# Patient Record
Sex: MALE | Race: WHITE | NOT HISPANIC OR LATINO | Employment: UNEMPLOYED | URBAN - METROPOLITAN AREA
[De-identification: names, ages, dates, MRNs, and addresses within clinical notes are randomized per-mention and may not be internally consistent; named-entity substitution may affect disease eponyms.]

---

## 2017-12-01 ENCOUNTER — OFFICE VISIT - HEALTHEAST (OUTPATIENT)
Dept: FAMILY MEDICINE | Facility: CLINIC | Age: 52
End: 2017-12-01

## 2017-12-01 ENCOUNTER — COMMUNICATION - HEALTHEAST (OUTPATIENT)
Dept: SCHEDULING | Facility: CLINIC | Age: 52
End: 2017-12-01

## 2017-12-01 DIAGNOSIS — I10 HTN (HYPERTENSION) WITH GOAL TO BE DETERMINED: ICD-10-CM

## 2017-12-01 ASSESSMENT — MIFFLIN-ST. JEOR: SCORE: 1632.32

## 2017-12-18 ENCOUNTER — OFFICE VISIT - HEALTHEAST (OUTPATIENT)
Dept: FAMILY MEDICINE | Facility: CLINIC | Age: 52
End: 2017-12-18

## 2017-12-18 DIAGNOSIS — Z13.21 SCREENING FOR ENDOCRINE, NUTRITIONAL, METABOLIC AND IMMUNITY DISORDER: ICD-10-CM

## 2017-12-18 DIAGNOSIS — I10 HYPERTENSION: ICD-10-CM

## 2017-12-18 DIAGNOSIS — Z00.00 VISIT FOR PREVENTIVE HEALTH EXAMINATION: ICD-10-CM

## 2017-12-18 DIAGNOSIS — Z12.11 SCREEN FOR COLON CANCER: ICD-10-CM

## 2017-12-18 DIAGNOSIS — E66.3 OVERWEIGHT (BMI 25.0-29.9): ICD-10-CM

## 2017-12-18 DIAGNOSIS — Z13.0 SCREENING FOR ENDOCRINE, NUTRITIONAL, METABOLIC AND IMMUNITY DISORDER: ICD-10-CM

## 2017-12-18 DIAGNOSIS — F41.9 ANXIETY: ICD-10-CM

## 2017-12-18 DIAGNOSIS — Z12.5 SCREENING PSA (PROSTATE SPECIFIC ANTIGEN): ICD-10-CM

## 2017-12-18 DIAGNOSIS — Z13.228 SCREENING FOR ENDOCRINE, NUTRITIONAL, METABOLIC AND IMMUNITY DISORDER: ICD-10-CM

## 2017-12-18 DIAGNOSIS — Z13.29 SCREENING FOR ENDOCRINE, NUTRITIONAL, METABOLIC AND IMMUNITY DISORDER: ICD-10-CM

## 2017-12-18 LAB
CHOLEST SERPL-MCNC: 237 MG/DL
FASTING STATUS PATIENT QL REPORTED: YES
HBA1C MFR BLD: 5.2 % (ref 3.5–6)
HDLC SERPL-MCNC: 38 MG/DL
LDLC SERPL CALC-MCNC: 127 MG/DL
PSA SERPL-MCNC: 0.8 NG/ML (ref 0–3.5)
TRIGL SERPL-MCNC: 358 MG/DL

## 2017-12-18 ASSESSMENT — MIFFLIN-ST. JEOR: SCORE: 1618.71

## 2018-01-17 ENCOUNTER — COMMUNICATION - HEALTHEAST (OUTPATIENT)
Dept: FAMILY MEDICINE | Facility: CLINIC | Age: 53
End: 2018-01-17

## 2018-03-05 ENCOUNTER — COMMUNICATION - HEALTHEAST (OUTPATIENT)
Dept: FAMILY MEDICINE | Facility: CLINIC | Age: 53
End: 2018-03-05

## 2018-06-18 ENCOUNTER — COMMUNICATION - HEALTHEAST (OUTPATIENT)
Dept: FAMILY MEDICINE | Facility: CLINIC | Age: 53
End: 2018-06-18

## 2018-06-18 DIAGNOSIS — I10 HYPERTENSION: ICD-10-CM

## 2018-10-03 ENCOUNTER — COMMUNICATION - HEALTHEAST (OUTPATIENT)
Dept: SCHEDULING | Facility: CLINIC | Age: 53
End: 2018-10-03

## 2018-10-11 ENCOUNTER — OFFICE VISIT - HEALTHEAST (OUTPATIENT)
Dept: FAMILY MEDICINE | Facility: CLINIC | Age: 53
End: 2018-10-11

## 2018-10-11 DIAGNOSIS — E78.2 MIXED HYPERLIPIDEMIA: ICD-10-CM

## 2018-10-11 DIAGNOSIS — Z00.00 VISIT FOR PREVENTIVE HEALTH EXAMINATION: ICD-10-CM

## 2018-10-11 DIAGNOSIS — E66.3 OVERWEIGHT (BMI 25.0-29.9): ICD-10-CM

## 2018-10-11 DIAGNOSIS — F41.9 ANXIETY: ICD-10-CM

## 2018-10-11 DIAGNOSIS — I10 BENIGN ESSENTIAL HYPERTENSION: ICD-10-CM

## 2018-10-11 LAB
ALBUMIN SERPL-MCNC: 4.3 G/DL (ref 3.5–5)
ALP SERPL-CCNC: 84 U/L (ref 45–120)
ALT SERPL W P-5'-P-CCNC: 38 U/L (ref 0–45)
ANION GAP SERPL CALCULATED.3IONS-SCNC: 11 MMOL/L (ref 5–18)
AST SERPL W P-5'-P-CCNC: 26 U/L (ref 0–40)
BILIRUB SERPL-MCNC: 0.8 MG/DL (ref 0–1)
BUN SERPL-MCNC: 9 MG/DL (ref 8–22)
CALCIUM SERPL-MCNC: 10.1 MG/DL (ref 8.5–10.5)
CHLORIDE BLD-SCNC: 106 MMOL/L (ref 98–107)
CHOLEST SERPL-MCNC: 252 MG/DL
CO2 SERPL-SCNC: 23 MMOL/L (ref 22–31)
CREAT SERPL-MCNC: 0.8 MG/DL (ref 0.7–1.3)
FASTING STATUS PATIENT QL REPORTED: NO
GFR SERPL CREATININE-BSD FRML MDRD: >60 ML/MIN/1.73M2
GLUCOSE BLD-MCNC: 99 MG/DL (ref 70–125)
HBA1C MFR BLD: 5.4 % (ref 3.5–6)
HDLC SERPL-MCNC: 42 MG/DL
LDLC SERPL CALC-MCNC: 157 MG/DL
POTASSIUM BLD-SCNC: 4.1 MMOL/L (ref 3.5–5)
PROT SERPL-MCNC: 7.2 G/DL (ref 6–8)
SODIUM SERPL-SCNC: 140 MMOL/L (ref 136–145)
TRIGL SERPL-MCNC: 264 MG/DL
TSH SERPL DL<=0.005 MIU/L-ACNC: 2.42 UIU/ML (ref 0.3–5)

## 2018-10-11 ASSESSMENT — MIFFLIN-ST. JEOR: SCORE: 1618.71

## 2018-12-13 ENCOUNTER — COMMUNICATION - HEALTHEAST (OUTPATIENT)
Dept: FAMILY MEDICINE | Facility: CLINIC | Age: 53
End: 2018-12-13

## 2018-12-13 DIAGNOSIS — I10 HYPERTENSION: ICD-10-CM

## 2019-01-28 ENCOUNTER — COMMUNICATION - HEALTHEAST (OUTPATIENT)
Dept: FAMILY MEDICINE | Facility: CLINIC | Age: 54
End: 2019-01-28

## 2019-01-28 DIAGNOSIS — I10 ESSENTIAL HYPERTENSION: ICD-10-CM

## 2019-04-01 ENCOUNTER — AMBULATORY - HEALTHEAST (OUTPATIENT)
Dept: FAMILY MEDICINE | Facility: CLINIC | Age: 54
End: 2019-04-01

## 2019-05-29 ENCOUNTER — COMMUNICATION - HEALTHEAST (OUTPATIENT)
Dept: FAMILY MEDICINE | Facility: CLINIC | Age: 54
End: 2019-05-29

## 2019-05-29 DIAGNOSIS — F41.9 ANXIETY: ICD-10-CM

## 2019-06-05 ENCOUNTER — COMMUNICATION - HEALTHEAST (OUTPATIENT)
Dept: FAMILY MEDICINE | Facility: CLINIC | Age: 54
End: 2019-06-05

## 2019-06-05 DIAGNOSIS — I10 ESSENTIAL HYPERTENSION: ICD-10-CM

## 2019-06-05 DIAGNOSIS — I10 HYPERTENSION: ICD-10-CM

## 2019-09-05 ENCOUNTER — COMMUNICATION - HEALTHEAST (OUTPATIENT)
Dept: FAMILY MEDICINE | Facility: CLINIC | Age: 54
End: 2019-09-05

## 2019-09-10 ENCOUNTER — COMMUNICATION - HEALTHEAST (OUTPATIENT)
Dept: FAMILY MEDICINE | Facility: CLINIC | Age: 54
End: 2019-09-10

## 2019-09-10 DIAGNOSIS — I10 ESSENTIAL HYPERTENSION: ICD-10-CM

## 2019-09-24 ENCOUNTER — COMMUNICATION - HEALTHEAST (OUTPATIENT)
Dept: FAMILY MEDICINE | Facility: CLINIC | Age: 54
End: 2019-09-24

## 2019-09-24 DIAGNOSIS — I10 HYPERTENSION: ICD-10-CM

## 2019-12-09 ENCOUNTER — OFFICE VISIT - HEALTHEAST (OUTPATIENT)
Dept: FAMILY MEDICINE | Facility: CLINIC | Age: 54
End: 2019-12-09

## 2019-12-09 DIAGNOSIS — I10 HYPERTENSION: ICD-10-CM

## 2019-12-09 DIAGNOSIS — Z12.11 SCREEN FOR COLON CANCER: ICD-10-CM

## 2019-12-09 DIAGNOSIS — R10.32 LLQ ABDOMINAL PAIN: ICD-10-CM

## 2019-12-09 DIAGNOSIS — Z12.11 SCREENING FOR COLON CANCER: ICD-10-CM

## 2019-12-09 DIAGNOSIS — F41.9 ANXIETY: ICD-10-CM

## 2019-12-09 DIAGNOSIS — I10 ESSENTIAL HYPERTENSION: ICD-10-CM

## 2019-12-09 LAB
ALBUMIN SERPL-MCNC: 4.5 G/DL (ref 3.5–5)
ALBUMIN UR-MCNC: NEGATIVE MG/DL
ALP SERPL-CCNC: 103 U/L (ref 45–120)
ALT SERPL W P-5'-P-CCNC: 31 U/L (ref 0–45)
ANION GAP SERPL CALCULATED.3IONS-SCNC: 11 MMOL/L (ref 5–18)
APPEARANCE UR: CLEAR
AST SERPL W P-5'-P-CCNC: 28 U/L (ref 0–40)
BASOPHILS # BLD AUTO: 0.1 THOU/UL (ref 0–0.2)
BASOPHILS NFR BLD AUTO: 1 % (ref 0–2)
BILIRUB SERPL-MCNC: 0.7 MG/DL (ref 0–1)
BILIRUB UR QL STRIP: NEGATIVE
BUN SERPL-MCNC: 7 MG/DL (ref 8–22)
CALCIUM SERPL-MCNC: 10 MG/DL (ref 8.5–10.5)
CHLORIDE BLD-SCNC: 101 MMOL/L (ref 98–107)
CO2 SERPL-SCNC: 27 MMOL/L (ref 22–31)
COLOR UR AUTO: YELLOW
CREAT SERPL-MCNC: 0.84 MG/DL (ref 0.7–1.3)
EOSINOPHIL # BLD AUTO: 0.1 THOU/UL (ref 0–0.4)
EOSINOPHIL NFR BLD AUTO: 2 % (ref 0–6)
ERYTHROCYTE [DISTWIDTH] IN BLOOD BY AUTOMATED COUNT: 10.5 % (ref 11–14.5)
GFR SERPL CREATININE-BSD FRML MDRD: >60 ML/MIN/1.73M2
GLUCOSE BLD-MCNC: 97 MG/DL (ref 70–125)
GLUCOSE UR STRIP-MCNC: NEGATIVE MG/DL
HCT VFR BLD AUTO: 49.1 % (ref 40–54)
HGB BLD-MCNC: 16.9 G/DL (ref 14–18)
HGB UR QL STRIP: NEGATIVE
KETONES UR STRIP-MCNC: NEGATIVE MG/DL
LEUKOCYTE ESTERASE UR QL STRIP: NEGATIVE
LYMPHOCYTES # BLD AUTO: 1.7 THOU/UL (ref 0.8–4.4)
LYMPHOCYTES NFR BLD AUTO: 23 % (ref 20–40)
MCH RBC QN AUTO: 37.1 PG (ref 27–34)
MCHC RBC AUTO-ENTMCNC: 34.5 G/DL (ref 32–36)
MCV RBC AUTO: 108 FL (ref 80–100)
MONOCYTES # BLD AUTO: 0.6 THOU/UL (ref 0–0.9)
MONOCYTES NFR BLD AUTO: 8 % (ref 2–10)
NEUTROPHILS # BLD AUTO: 5 THOU/UL (ref 2–7.7)
NEUTROPHILS NFR BLD AUTO: 67 % (ref 50–70)
NITRATE UR QL: NEGATIVE
PH UR STRIP: 6.5 [PH] (ref 5–8)
PLATELET # BLD AUTO: 241 THOU/UL (ref 140–440)
PMV BLD AUTO: 7.8 FL (ref 7–10)
POTASSIUM BLD-SCNC: 4.6 MMOL/L (ref 3.5–5)
PROT SERPL-MCNC: 7.8 G/DL (ref 6–8)
RBC # BLD AUTO: 4.56 MILL/UL (ref 4.4–6.2)
SODIUM SERPL-SCNC: 139 MMOL/L (ref 136–145)
SP GR UR STRIP: 1.01 (ref 1–1.03)
UROBILINOGEN UR STRIP-ACNC: NORMAL
WBC: 7.6 THOU/UL (ref 4–11)

## 2019-12-11 ENCOUNTER — HOSPITAL ENCOUNTER (OUTPATIENT)
Dept: CT IMAGING | Facility: CLINIC | Age: 54
Discharge: HOME | End: 2019-12-11
Attending: FAMILY MEDICINE

## 2019-12-11 DIAGNOSIS — R10.32 LLQ ABDOMINAL PAIN: ICD-10-CM

## 2019-12-12 ENCOUNTER — COMMUNICATION - HEALTHEAST (OUTPATIENT)
Dept: FAMILY MEDICINE | Facility: CLINIC | Age: 54
End: 2019-12-12

## 2020-08-27 ENCOUNTER — COMMUNICATION - HEALTHEAST (OUTPATIENT)
Dept: FAMILY MEDICINE | Facility: CLINIC | Age: 55
End: 2020-08-27

## 2020-08-27 DIAGNOSIS — I10 HYPERTENSION: ICD-10-CM

## 2020-09-07 ENCOUNTER — COMMUNICATION - HEALTHEAST (OUTPATIENT)
Dept: FAMILY MEDICINE | Facility: CLINIC | Age: 55
End: 2020-09-07

## 2020-09-07 DIAGNOSIS — F41.9 ANXIETY: ICD-10-CM

## 2020-09-10 RX ORDER — ALPRAZOLAM 0.5 MG
TABLET ORAL
Qty: 10 TABLET | Refills: 0 | Status: SHIPPED | OUTPATIENT
Start: 2020-09-10 | End: 2021-11-04

## 2020-10-29 ENCOUNTER — OFFICE VISIT - HEALTHEAST (OUTPATIENT)
Dept: FAMILY MEDICINE | Facility: CLINIC | Age: 55
End: 2020-10-29

## 2020-10-29 DIAGNOSIS — E78.2 MIXED HYPERLIPIDEMIA: ICD-10-CM

## 2020-10-29 DIAGNOSIS — K57.30 DIVERTICULOSIS OF LARGE INTESTINE WITHOUT HEMORRHAGE: ICD-10-CM

## 2020-10-29 DIAGNOSIS — I10 HYPERTENSION: ICD-10-CM

## 2020-10-29 DIAGNOSIS — Z12.11 SCREENING FOR COLON CANCER: ICD-10-CM

## 2020-10-29 DIAGNOSIS — Z00.00 VISIT FOR PREVENTIVE HEALTH EXAMINATION: ICD-10-CM

## 2020-10-29 DIAGNOSIS — I10 ESSENTIAL HYPERTENSION: ICD-10-CM

## 2020-10-29 DIAGNOSIS — Z12.5 SCREENING PSA (PROSTATE SPECIFIC ANTIGEN): ICD-10-CM

## 2020-10-29 DIAGNOSIS — Z11.4 SCREENING FOR HIV WITHOUT PRESENCE OF RISK FACTORS: ICD-10-CM

## 2020-10-29 DIAGNOSIS — Z11.59 ENCOUNTER FOR HCV SCREENING TEST FOR LOW RISK PATIENT: ICD-10-CM

## 2020-10-29 LAB
ALBUMIN SERPL-MCNC: 4.7 G/DL (ref 3.5–5)
ALP SERPL-CCNC: 83 U/L (ref 45–120)
ALT SERPL W P-5'-P-CCNC: 52 U/L (ref 0–45)
ANION GAP SERPL CALCULATED.3IONS-SCNC: 15 MMOL/L (ref 5–18)
AST SERPL W P-5'-P-CCNC: 42 U/L (ref 0–40)
BASOPHILS # BLD AUTO: 0.1 THOU/UL (ref 0–0.2)
BASOPHILS NFR BLD AUTO: 1 % (ref 0–2)
BILIRUB SERPL-MCNC: 1 MG/DL (ref 0–1)
BUN SERPL-MCNC: 8 MG/DL (ref 8–22)
CALCIUM SERPL-MCNC: 10.1 MG/DL (ref 8.5–10.5)
CHLORIDE BLD-SCNC: 99 MMOL/L (ref 98–107)
CHOLEST SERPL-MCNC: 254 MG/DL
CO2 SERPL-SCNC: 26 MMOL/L (ref 22–31)
CREAT SERPL-MCNC: 0.82 MG/DL (ref 0.7–1.3)
EOSINOPHIL # BLD AUTO: 0.1 THOU/UL (ref 0–0.4)
EOSINOPHIL NFR BLD AUTO: 2 % (ref 0–6)
ERYTHROCYTE [DISTWIDTH] IN BLOOD BY AUTOMATED COUNT: 12.5 % (ref 11–14.5)
FASTING STATUS PATIENT QL REPORTED: YES
GFR SERPL CREATININE-BSD FRML MDRD: >60 ML/MIN/1.73M2
GLUCOSE BLD-MCNC: 85 MG/DL (ref 70–125)
HCT VFR BLD AUTO: 51.9 % (ref 40–54)
HDLC SERPL-MCNC: 48 MG/DL
HGB BLD-MCNC: 18 G/DL (ref 14–18)
HIV 1+2 AB+HIV1 P24 AG SERPL QL IA: NEGATIVE
IMM GRANULOCYTES # BLD: 0 THOU/UL
IMM GRANULOCYTES NFR BLD: 0 %
LDLC SERPL CALC-MCNC: 176 MG/DL
LYMPHOCYTES # BLD AUTO: 1.3 THOU/UL (ref 0.8–4.4)
LYMPHOCYTES NFR BLD AUTO: 18 % (ref 20–40)
MCH RBC QN AUTO: 36.1 PG (ref 27–34)
MCHC RBC AUTO-ENTMCNC: 34.7 G/DL (ref 32–36)
MCV RBC AUTO: 104 FL (ref 80–100)
MONOCYTES # BLD AUTO: 0.7 THOU/UL (ref 0–0.9)
MONOCYTES NFR BLD AUTO: 10 % (ref 2–10)
NEUTROPHILS # BLD AUTO: 4.9 THOU/UL (ref 2–7.7)
NEUTROPHILS NFR BLD AUTO: 69 % (ref 50–70)
PLATELET # BLD AUTO: 240 THOU/UL (ref 140–440)
PMV BLD AUTO: 11.3 FL (ref 8.5–12.5)
POTASSIUM BLD-SCNC: 4.4 MMOL/L (ref 3.5–5)
PROT SERPL-MCNC: 7.8 G/DL (ref 6–8)
PSA SERPL-MCNC: 0.5 NG/ML (ref 0–3.5)
RBC # BLD AUTO: 4.98 MILL/UL (ref 4.4–6.2)
SODIUM SERPL-SCNC: 140 MMOL/L (ref 136–145)
TRIGL SERPL-MCNC: 149 MG/DL
TSH SERPL DL<=0.005 MIU/L-ACNC: 1.55 UIU/ML (ref 0.3–5)
WBC: 7.1 THOU/UL (ref 4–11)

## 2020-10-29 ASSESSMENT — MIFFLIN-ST. JEOR: SCORE: 1600.64

## 2020-10-30 ENCOUNTER — COMMUNICATION - HEALTHEAST (OUTPATIENT)
Dept: FAMILY MEDICINE | Facility: CLINIC | Age: 55
End: 2020-10-30

## 2020-10-30 LAB — HCV AB SERPL QL IA: NEGATIVE

## 2020-10-30 RX ORDER — BENAZEPRIL/HYDROCHLOROTHIAZIDE 20 MG-25MG
1 TABLET ORAL DAILY
Qty: 90 TABLET | Refills: 3 | Status: SHIPPED | OUTPATIENT
Start: 2020-10-30 | End: 2021-07-13

## 2020-11-01 LAB — LIPOPROTEIN (A) - HISTORICAL: 20 MG/DL

## 2020-11-02 ENCOUNTER — COMMUNICATION - HEALTHEAST (OUTPATIENT)
Dept: FAMILY MEDICINE | Facility: CLINIC | Age: 55
End: 2020-11-02

## 2020-11-02 DIAGNOSIS — E78.5 HYPERLIPIDEMIA LDL GOAL <130: ICD-10-CM

## 2020-11-02 RX ORDER — ATORVASTATIN CALCIUM 10 MG/1
10 TABLET, FILM COATED ORAL DAILY
Qty: 30 TABLET | Refills: 11 | Status: SHIPPED | OUTPATIENT
Start: 2020-11-02 | End: 2021-09-14

## 2020-11-03 ENCOUNTER — COMMUNICATION - HEALTHEAST (OUTPATIENT)
Dept: FAMILY MEDICINE | Facility: CLINIC | Age: 55
End: 2020-11-03

## 2020-11-03 DIAGNOSIS — I10 ESSENTIAL HYPERTENSION: ICD-10-CM

## 2021-04-02 ENCOUNTER — AMBULATORY - HEALTHEAST (OUTPATIENT)
Dept: NURSING | Facility: CLINIC | Age: 56
End: 2021-04-02

## 2021-04-23 ENCOUNTER — RECORDS - HEALTHEAST (OUTPATIENT)
Dept: ADMINISTRATIVE | Facility: OTHER | Age: 56
End: 2021-04-23

## 2021-04-23 ENCOUNTER — AMBULATORY - HEALTHEAST (OUTPATIENT)
Dept: NURSING | Facility: CLINIC | Age: 56
End: 2021-04-23

## 2021-05-27 ENCOUNTER — RECORDS - HEALTHEAST (OUTPATIENT)
Dept: ADMINISTRATIVE | Facility: CLINIC | Age: 56
End: 2021-05-27

## 2021-05-29 NOTE — TELEPHONE ENCOUNTER
Refill Approved    Rx renewed per Medication Renewal Policy.     Benazepril-HTCZ was last renewed on 1/29/19  #90 R-2.    Last OV 10/11/18    Shaila Godwin, [facility] Connection Triage/Med Refill 6/6/2019     Requested Prescriptions   Pending Prescriptions Disp Refills     benazepril-hydrochlorthiazide (LOTENSIN HCT) 10-12.5 mg per tablet [Pharmacy Med Name: BENAZEPRIL/HCTZ 10/12.5MG TABLETS] 90 tablet 0     Sig: TAKE 1 TABLET BY MOUTH DAILY       Diuretics/Combination Diuretics Refill Protocol  Passed - 6/5/2019  9:51 AM        Passed - Visit with PCP or prescribing provider visit in past 12 months     Last office visit with prescriber/PCP: 6/28/2016 Tj Early MD OR same dept: Visit date not found OR same specialty: 12/1/2017 Tata Manuel MD  Last physical: 10/11/2018 Last MTM visit: Visit date not found   Next visit within 3 mo: Visit date not found  Next physical within 3 mo: Visit date not found  Prescriber OR PCP: Tj Early MD  Last diagnosis associated with med order: 1. Essential hypertension  - benazepril-hydrochlorthiazide (LOTENSIN HCT) 10-12.5 mg per tablet [Pharmacy Med Name: BENAZEPRIL/HCTZ 10/12.5MG TABLETS]; TAKE 1 TABLET BY MOUTH DAILY  Dispense: 90 tablet; Refill: 0    2. Hypertension  - lisinopril (PRINIVIL,ZESTRIL) 5 MG tablet [Pharmacy Med Name: LISINOPRIL 5MG TABLETS]; TAKE 1 TABLET(5 MG) BY MOUTH DAILY  Dispense: 90 tablet; Refill: 0    If protocol passes may refill for 12 months if within 3 months of last provider visit (or a total of 15 months).             Passed - Serum Potassium in past 12 months      Lab Results   Component Value Date    Potassium 4.1 10/11/2018             Passed - Serum Sodium in past 12 months      Lab Results   Component Value Date    Sodium 140 10/11/2018             Passed - Blood pressure on file in past 12 months     BP Readings from Last 1 Encounters:   10/11/18 134/86             Passed - Serum Creatinine in past 12 months       Creatinine   Date Value Ref Range Status   10/11/2018 0.80 0.70 - 1.30 mg/dL Final             lisinopril (PRINIVIL,ZESTRIL) 5 MG tablet [Pharmacy Med Name: LISINOPRIL 5MG TABLETS] 90 tablet 0     Sig: TAKE 1 TABLET(5 MG) BY MOUTH DAILY       Ace Inhibitors Refill Protocol Passed - 6/5/2019  9:51 AM        Passed - PCP or prescribing provider visit in past 12 months       Last office visit with prescriber/PCP: 6/28/2016 Tj Early MD OR same dept: Visit date not found OR same specialty: 12/1/2017 Tata Manuel MD  Last physical: 10/11/2018 Last MTM visit: Visit date not found   Next visit within 3 mo: Visit date not found  Next physical within 3 mo: Visit date not found  Prescriber OR PCP: Tj Early MD  Last diagnosis associated with med order: 1. Essential hypertension  - benazepril-hydrochlorthiazide (LOTENSIN HCT) 10-12.5 mg per tablet [Pharmacy Med Name: BENAZEPRIL/HCTZ 10/12.5MG TABLETS]; TAKE 1 TABLET BY MOUTH DAILY  Dispense: 90 tablet; Refill: 0    2. Hypertension  - lisinopril (PRINIVIL,ZESTRIL) 5 MG tablet [Pharmacy Med Name: LISINOPRIL 5MG TABLETS]; TAKE 1 TABLET(5 MG) BY MOUTH DAILY  Dispense: 90 tablet; Refill: 0    If protocol passes may refill for 12 months if within 3 months of last provider visit (or a total of 15 months).             Passed - Serum Potassium in past 12 months     Lab Results   Component Value Date    Potassium 4.1 10/11/2018             Passed - Blood pressure filed in past 12 months     BP Readings from Last 1 Encounters:   10/11/18 134/86             Passed - Serum Creatinine in past 12 months     Creatinine   Date Value Ref Range Status   10/11/2018 0.80 0.70 - 1.30 mg/dL Final

## 2021-05-29 NOTE — TELEPHONE ENCOUNTER
RN cannot approve Refill Request    RN can NOT refill this medication Lisinopril is a  historical medication requested. Last office visit: 6/28/2016 Tj Early MD Last Physical: 10/11/2018 Last MTM visit: Visit date not found Last visit same specialty: 12/1/2017 Tata Manuel MD.  Next visit within 3 mo: Visit date not found  Next physical within 3 mo: Visit date not found      Shaila Godwin, [facility] Triage/Med Refill 6/7/2019    Requested Prescriptions   Pending Prescriptions Disp Refills     lisinopril (PRINIVIL,ZESTRIL) 5 MG tablet [Pharmacy Med Name: LISINOPRIL 5MG TABLETS] 90 tablet 0     Sig: TAKE 1 TABLET(5 MG) BY MOUTH DAILY       Ace Inhibitors Refill Protocol Passed - 6/5/2019  9:51 AM        Passed - PCP or prescribing provider visit in past 12 months       Last office visit with prescriber/PCP: 6/28/2016 Tj Early MD OR same dept: Visit date not found OR same specialty: 12/1/2017 Tata Manuel MD  Last physical: 10/11/2018 Last MTM visit: Visit date not found   Next visit within 3 mo: Visit date not found  Next physical within 3 mo: Visit date not found  Prescriber OR PCP: Tj Early MD  Last diagnosis associated with med order: 1. Essential hypertension  - benazepril-hydrochlorthiazide (LOTENSIN HCT) 10-12.5 mg per tablet; TAKE 1 TABLET BY MOUTH DAILY  Dispense: 90 tablet; Refill: 1    2. Hypertension  - lisinopril (PRINIVIL,ZESTRIL) 5 MG tablet [Pharmacy Med Name: LISINOPRIL 5MG TABLETS]; TAKE 1 TABLET(5 MG) BY MOUTH DAILY  Dispense: 90 tablet; Refill: 0    If protocol passes may refill for 12 months if within 3 months of last provider visit (or a total of 15 months).             Passed - Serum Potassium in past 12 months     Lab Results   Component Value Date    Potassium 4.1 10/11/2018             Passed - Blood pressure filed in past 12 months     BP Readings from Last 1 Encounters:   10/11/18 134/86             Passed - Serum  Creatinine in past 12 months     Creatinine   Date Value Ref Range Status   10/11/2018 0.80 0.70 - 1.30 mg/dL Final           Signed Prescriptions Disp Refills    benazepril-hydrochlorthiazide (LOTENSIN HCT) 10-12.5 mg per tablet 90 tablet 1     Sig: TAKE 1 TABLET BY MOUTH DAILY       Diuretics/Combination Diuretics Refill Protocol  Passed - 6/5/2019  9:51 AM        Passed - Visit with PCP or prescribing provider visit in past 12 months     Last office visit with prescriber/PCP: 6/28/2016 Tj Early MD OR same dept: Visit date not found OR same specialty: 12/1/2017 Tata Manuel MD  Last physical: 10/11/2018 Last MTM visit: Visit date not found   Next visit within 3 mo: Visit date not found  Next physical within 3 mo: Visit date not found  Prescriber OR PCP: Tj Early MD  Last diagnosis associated with med order: 1. Essential hypertension  - benazepril-hydrochlorthiazide (LOTENSIN HCT) 10-12.5 mg per tablet; TAKE 1 TABLET BY MOUTH DAILY  Dispense: 90 tablet; Refill: 1    2. Hypertension  - lisinopril (PRINIVIL,ZESTRIL) 5 MG tablet [Pharmacy Med Name: LISINOPRIL 5MG TABLETS]; TAKE 1 TABLET(5 MG) BY MOUTH DAILY  Dispense: 90 tablet; Refill: 0    If protocol passes may refill for 12 months if within 3 months of last provider visit (or a total of 15 months).             Passed - Serum Potassium in past 12 months      Lab Results   Component Value Date    Potassium 4.1 10/11/2018             Passed - Serum Sodium in past 12 months      Lab Results   Component Value Date    Sodium 140 10/11/2018             Passed - Blood pressure on file in past 12 months     BP Readings from Last 1 Encounters:   10/11/18 134/86             Passed - Serum Creatinine in past 12 months      Creatinine   Date Value Ref Range Status   10/11/2018 0.80 0.70 - 1.30 mg/dL Final

## 2021-05-29 NOTE — TELEPHONE ENCOUNTER
Controlled Substance Refill Request  Medication:   Requested Prescriptions     Pending Prescriptions Disp Refills     ALPRAZolam (XANAX) 0.5 MG tablet 10 tablet 0     Sig: Take 1 tablet (0.5 mg total) by mouth daily as needed for anxiety.     Date Last Fill: 10/11/18  Pharmacy: [id number]   Submit electronically to pharmacy  Controlled Substance Agreement on File:   Encounter-Level CSA Scan Date:    There are no encounter-level csa scan date.       Last office visit: Last office visit pertaining to requested medication was 10/11/18.

## 2021-05-31 VITALS — BODY MASS INDEX: 26.51 KG/M2 | HEIGHT: 69 IN | WEIGHT: 179 LBS

## 2021-05-31 VITALS — WEIGHT: 176 LBS | HEIGHT: 69 IN | BODY MASS INDEX: 26.07 KG/M2

## 2021-06-01 NOTE — TELEPHONE ENCOUNTER
Refill Request  Did you contact pharmacy: Yes  Medication name:   Requested Prescriptions     Pending Prescriptions Disp Refills     benazepril-hydrochlorthiazide (LOTENSIN HCT) 10-12.5 mg per tablet 90 tablet 1     Sig: Take 1 tablet by mouth daily.     Who prescribed the medication:   XIOMARA MEZA  Pharmacy Name and Location:   [id number]  Is patient out of medication: Yes  Patient notified refills processed in 72 hours:  yes  Okay to leave a detailed message: yes    Please expedite per patient request.

## 2021-06-01 NOTE — TELEPHONE ENCOUNTER
Refill Approved    Rx renewed per Medication Renewal Policy. Medication was last renewed on 6/7/19.    Nathalie Huizar, [facility] [place] Connection Triage/Med Refill 9/25/2019     Requested Prescriptions   Pending Prescriptions Disp Refills     lisinopril (PRINIVIL,ZESTRIL) 5 MG tablet 90 tablet 0     Sig: Take 1 tablet (5 mg total) by mouth daily.       Ace Inhibitors Refill Protocol Passed - 9/24/2019  9:03 AM        Passed - PCP or prescribing provider visit in past 12 months       Last office visit with prescriber/PCP: 6/28/2016 Tj Early MD OR same dept: Visit date not found OR same specialty: 12/1/2017 Tata Manuel MD  Last physical: 10/11/2018 Last MTM visit: Visit date not found   Next visit within 3 mo: Visit date not found  Next physical within 3 mo: Visit date not found  Prescriber OR PCP: Tj Early MD  Last diagnosis associated with med order: 1. Hypertension  - lisinopril (PRINIVIL,ZESTRIL) 5 MG tablet; Take 1 tablet (5 mg total) by mouth daily.  Dispense: 90 tablet; Refill: 0    If protocol passes may refill for 12 months if within 3 months of last provider visit (or a total of 15 months).             Passed - Serum Potassium in past 12 months     Lab Results   Component Value Date    Potassium 4.1 10/11/2018             Passed - Blood pressure filed in past 12 months     BP Readings from Last 1 Encounters:   10/11/18 134/86             Passed - Serum Creatinine in past 12 months     Creatinine   Date Value Ref Range Status   10/11/2018 0.80 0.70 - 1.30 mg/dL Final

## 2021-06-01 NOTE — TELEPHONE ENCOUNTER
Refill Approved    Rx renewed per Medication Renewal Policy. Medication was last renewed on 6/6/2019.  Last OV 10/11/2018.    Jessica De Leon, [facility] Connection Triage/Med Refill 9/10/2019     Requested Prescriptions   Pending Prescriptions Disp Refills     benazepril-hydrochlorthiazide (LOTENSIN HCT) 10-12.5 mg per tablet 90 tablet 1     Sig: Take 1 tablet by mouth daily.       Diuretics/Combination Diuretics Refill Protocol  Passed - 9/10/2019 12:22 PM        Passed - Visit with PCP or prescribing provider visit in past 12 months     Last office visit with prescriber/PCP: 6/28/2016 Tj Early MD OR same dept: Visit date not found OR same specialty: 12/1/2017 Tata Manuel MD  Last physical: 10/11/2018 Last MTM visit: Visit date not found   Next visit within 3 mo: Visit date not found  Next physical within 3 mo: Visit date not found  Prescriber OR PCP: Tj Early MD  Last diagnosis associated with med order: 1. Essential hypertension  - benazepril-hydrochlorthiazide (LOTENSIN HCT) 10-12.5 mg per tablet; Take 1 tablet by mouth daily.  Dispense: 90 tablet; Refill: 1    If protocol passes may refill for 12 months if within 3 months of last provider visit (or a total of 15 months).             Passed - Serum Potassium in past 12 months      Lab Results   Component Value Date    Potassium 4.1 10/11/2018             Passed - Serum Sodium in past 12 months      Lab Results   Component Value Date    Sodium 140 10/11/2018             Passed - Blood pressure on file in past 12 months     BP Readings from Last 1 Encounters:   10/11/18 134/86             Passed - Serum Creatinine in past 12 months      Creatinine   Date Value Ref Range Status   10/11/2018 0.80 0.70 - 1.30 mg/dL Final

## 2021-06-02 VITALS — BODY MASS INDEX: 26.07 KG/M2 | HEIGHT: 69 IN | WEIGHT: 176 LBS

## 2021-06-04 VITALS
SYSTOLIC BLOOD PRESSURE: 125 MMHG | BODY MASS INDEX: 25.1 KG/M2 | DIASTOLIC BLOOD PRESSURE: 85 MMHG | OXYGEN SATURATION: 97 % | WEIGHT: 170 LBS | HEART RATE: 74 BPM

## 2021-06-04 NOTE — PROGRESS NOTES
OFFICE VISIT - FAMILY MEDICINE     ASSESSMENT AND PLAN     1. LLQ abdominal pain  HM1(CBC and Differential)    Comprehensive Metabolic Panel    Urinalysis-UC if Indicated    HM1 (CBC with Diff)    CT Abdomen Pelvis Without Oral Without IV Contrast    CT Abdomen Pelvis With Oral With IV Contrast   2. Anxiety  ALPRAZolam (XANAX) 0.5 MG tablet   3. Essential hypertension     4. Hypertension  benazepril-hydrochlorthiazide (LOTENSIN HCT) 20-12.5 mg per tablet   5. Screen for colon cancer  Ambulatory referral for Colonoscopy   6. Screening for colon cancer     Left lower quadrant pain, differential diagnosis discussed included kidney stone, diverticulitis, initial lab work ordered, urine analysis negative, CT scan with without contrast pending.  For recommendation based on imaging.  Anxiety controlled alprazolam as needed, taxation technique discussed.  Hypertension only take benazepril, new dose medication sent to pharmacy continue to check blood pressure weekly on referral see if uncontrolled.  Screening colon cancer with risk and benefit discussed, colonoscopy order signed to be done after his recovery from abdominal pain.    CHIEF COMPLAINT   Abdominal Pain (Lower lt.; started a couple weeks ago-in the last week had a flare-up-waves. Yesterday started to feel better, yet when pushes on lower lt. abdomen-sensitive/discomfort.)    HPI   Matt Alfaro is a 54 y.o. male.  Updated MIIC - Needs Colonoscopy    Left lower quadrant pain for about a couple of weeks now, mild to moderate, throbbing, intermittent, no specific trigger that patient is aware of.  No radiation.  No back pain.  No nausea or vomiting.  No prior history of kidney stone.  No diarrhea, no constipation.  He was referred to get a screening colonoscopy last year but has not done that yet.  Hypertension, currently taking lisinopril and benazepril, with good control of his blood pressure. We've  Discussed about taking only one risk medication.  Mild anxiety,  has used alprazolam as needed with good symptom control, asking for refill.    Review of Systems As per HPI, otherwise negative.    OBJECTIVE   /85 (Patient Site: Left Arm, Patient Position: Sitting, Cuff Size: Adult Regular)   Pulse 74   Wt 170 lb (77.1 kg)   SpO2 97%   BMI 25.10 kg/m    Physical Exam   Constitutional: He is oriented to person, place, and time. He appears well-developed and well-nourished.   HENT:   Head: Normocephalic and atraumatic.   Neck: Normal range of motion. Neck supple. No JVD present. No tracheal deviation present. No thyromegaly present.   Cardiovascular: Normal rate, regular rhythm, normal heart sounds and intact distal pulses. Exam reveals no gallop and no friction rub.   No murmur heard.  Pulmonary/Chest: Effort normal and breath sounds normal. No respiratory distress. He has no wheezes. He has no rales.   Abdominal: He exhibits no distension and no mass. Tenderness: Left lower quadrant. There is no rebound and no guarding.   Musculoskeletal:         General: No tenderness or edema.   Lymphadenopathy:     He has no cervical adenopathy.   Neurological: He is alert and oriented to person, place, and time. Coordination normal.   Psychiatric: He has a normal mood and affect. Judgment and thought content normal.       PFSH     Family History   Problem Relation Age of Onset     Arthritis Mother      Cancer Mother      Depression Mother      Early death Father      Heart disease Father      Hypertension Sister      Hypertension Brother      Social History     Socioeconomic History     Marital status: Life Partner     Spouse name: Not on file     Number of children: Not on file     Years of education: Not on file     Highest education level: Not on file   Occupational History     Not on file   Social Needs     Financial resource strain: Not on file     Food insecurity:     Worry: Not on file     Inability: Not on file     Transportation needs:     Medical: Not on file      Non-medical: Not on file   Tobacco Use     Smoking status: Current Every Day Smoker     Smokeless tobacco: Never Used   Substance and Sexual Activity     Alcohol use: Not on file     Drug use: Not on file     Sexual activity: Not on file   Lifestyle     Physical activity:     Days per week: Not on file     Minutes per session: Not on file     Stress: Not on file   Relationships     Social connections:     Talks on phone: Not on file     Gets together: Not on file     Attends [employer] service: Not on file     Active member of club or organization: Not on file     Attends meetings of clubs or organizations: Not on file     Relationship status: Not on file     Intimate partner violence:     Fear of current or ex partner: Not on file     Emotionally abused: Not on file     Physically abused: Not on file     Forced sexual activity: Not on file   Other Topics Concern     Not on file   Social History Narrative     Not on file     Relevant history was reviewed with the patient today, unless noted in HPI, nothing is pertinent for this visit.  [facility]     Patient Active Problem List    Diagnosis Date Noted     Mixed hyperlipidemia 10/11/2018     Essential hypertension 12/03/2017     Anxiety 06/28/2016     Vitamin D deficiency disease 03/06/2016     No past surgical history on file.    RESULTS/CONSULTS (Lab/Rad)     Recent Results (from the past 168 hour(s))   HM1 (CBC with Diff)   Result Value Ref Range    WBC 7.6 4.0 - 11.0 thou/uL    RBC 4.56 4.40 - 6.20 mill/uL    Hemoglobin 16.9 14.0 - 18.0 g/dL    Hematocrit 49.1 40.0 - 54.0 %     (H) 80 - 100 fL    MCH 37.1 (H) 27.0 - 34.0 pg    MCHC 34.5 32.0 - 36.0 g/dL    RDW 10.5 (L) 11.0 - 14.5 %    Platelets 241 140 - 440 thou/uL    MPV 7.8 7.0 - 10.0 fL    Neutrophils % 67 50 - 70 %    Lymphocytes % 23 20 - 40 %    Monocytes % 8 2 - 10 %    Eosinophils % 2 0 - 6 %    Basophils % 1 0 - 2 %    Neutrophils Absolute 5.0 2.0 - 7.7 thou/uL    Lymphocytes Absolute 1.7 0.8 - 4.4  thou/uL    Monocytes Absolute 0.6 0.0 - 0.9 thou/uL    Eosinophils Absolute 0.1 0.0 - 0.4 thou/uL    Basophils Absolute 0.1 0.0 - 0.2 thou/uL   Urinalysis-UC if Indicated   Result Value Ref Range    Color, UA Yellow Colorless, Yellow, Straw, Light Yellow    Clarity, UA Clear Clear    Glucose, UA Negative Negative    Bilirubin, UA Negative Negative    Ketones, UA Negative Negative    Specific Gravity, UA 1.010 1.005 - 1.030    Blood, UA Negative Negative    pH, UA 6.5 5.0 - 8.0    Protein, UA Negative Negative mg/dL    Urobilinogen, UA 0.2 E.U./dL 0.2 E.U./dL, 1.0 E.U./dL    Nitrite, UA Negative Negative    Leukocytes, UA Negative Negative     No results found.  MEDICATIONS     Current Outpatient Medications on File Prior to Visit   Medication Sig Dispense Refill     benazepril-hydrochlorthiazide (LOTENSIN HCT) 10-12.5 mg per tablet Take 1 tablet by mouth daily. 90 tablet 0     docosahexanoic acid/epa (FISH OIL ORAL) Take by mouth.       UNABLE TO FIND Med Name:flax seed/fiber       [DISCONTINUED] ALPRAZolam (XANAX) 0.5 MG tablet Take 1 tablet (0.5 mg total) by mouth daily as needed for anxiety. 10 tablet 0     [DISCONTINUED] lisinopril (PRINIVIL,ZESTRIL) 5 MG tablet Take 1 tablet (5 mg total) by mouth daily. 90 tablet 0     No current facility-administered medications on file prior to visit.        HEALTH MAINTENANCE / SCREENING   PHQ-2 Total Score: 0 (12/9/2019 11:05 AM)  , No data recorded,No data recorded  Immunization History   Administered Date(s) Administered     Influenza,seasonal quad, PF, =/> 6months 12/09/2019     Pneumo Polysac 23-V 12/09/2019     Tdap 01/19/2012     Health Maintenance   Topic     HEPATITIS C SCREENING      HIV SCREENING      ZOSTER VACCINES (1 of 2)     PREVENTIVE CARE VISIT      TD 18+ HE      TDAP ADULT ONE TIME DOSE      LIPID      ADVANCE CARE PLANNING      COLONOSCOPY      PNEUMOCOCCAL IMMUNIZATION 19-64 MEDIUM RISK      INFLUENZA VACCINE RULE BASED      Tj Early,  MD  Family Medicine, [facility]     This note was dictated using a voice recognition software.  Any grammatical or context distortion are unintentional and inherent to the software.

## 2021-06-05 VITALS
OXYGEN SATURATION: 97 % | BODY MASS INDEX: 25.33 KG/M2 | RESPIRATION RATE: 99 BRPM | SYSTOLIC BLOOD PRESSURE: 140 MMHG | HEIGHT: 69 IN | WEIGHT: 171 LBS | HEART RATE: 73 BPM | DIASTOLIC BLOOD PRESSURE: 87 MMHG

## 2021-06-11 NOTE — TELEPHONE ENCOUNTER
Controlled Substance Refill Request  Medication Name:   Requested Prescriptions     Pending Prescriptions Disp Refills     ALPRAZolam (XANAX) 0.5 MG tablet [Pharmacy Med Name: ALPRAZOLAM 0.5MG TABLETS] 10 tablet 0     Sig: TAKE 1 TABLET(0.5 MG) BY MOUTH DAILY AS NEEDED FOR ANXIETY     Date Last Fill: 12/9/19  Requested Pharmacy: [facility]  Submit electronically to pharmacy  Controlled Substance Agreement on file:   Encounter-Level CSA Scan Date:    There are no encounter-level csa scan date.        Last office visit:  12/9/19

## 2021-06-11 NOTE — TELEPHONE ENCOUNTER
Refill Approved    Rx renewed per Medication Renewal Policy. Medication was last renewed on 12/9/19.    Nathalie Huizar, [facility] Triage/Med Refill 8/31/2020     Requested Prescriptions   Pending Prescriptions Disp Refills     benazepril-hydrochlorthiazide (LOTENSIN HCT) 20-12.5 mg per tablet [Pharmacy Med Name: BENAZEPRIL/HCTZ 20/12.5MG TABLETS] 90 tablet 2     Sig: TAKE 1 TABLET BY MOUTH DAILY       Diuretics/Combination Diuretics Refill Protocol  Passed - 8/27/2020  9:35 AM        Passed - Visit with PCP or prescribing provider visit in past 12 months     Last office visit with prescriber/PCP: 12/9/2019 Tj Early MD OR same dept: 12/9/2019 Tj Early MD OR same specialty: 12/9/2019 Tj Early MD  Last physical: 10/11/2018 Last MTM visit: Visit date not found   Next visit within 3 mo: Visit date not found  Next physical within 3 mo: Visit date not found  Prescriber OR PCP: Tj Early MD  Last diagnosis associated with med order: 1. Hypertension  - benazepril-hydrochlorthiazide (LOTENSIN HCT) 20-12.5 mg per tablet [Pharmacy Med Name: BENAZEPRIL/HCTZ 20/12.5MG TABLETS]; Take 1 tablet by mouth daily.  Dispense: 90 tablet; Refill: 2    If protocol passes may refill for 12 months if within 3 months of last provider visit (or a total of 15 months).             Passed - Serum Potassium in past 12 months      Lab Results   Component Value Date    Potassium 4.6 12/09/2019             Passed - Serum Sodium in past 12 months      Lab Results   Component Value Date    Sodium 139 12/09/2019             Passed - Blood pressure on file in past 12 months     BP Readings from Last 1 Encounters:   12/09/19 125/85             Passed - Serum Creatinine in past 12 months      Creatinine   Date Value Ref Range Status   12/09/2019 0.84 0.70 - 1.30 mg/dL Final

## 2021-06-12 NOTE — PROGRESS NOTES
Assessment:      Healthy male exam.        Plan:     1.  Uncontrolled hypertension, increase current blood pressure pill to a total of benazepril 20 mg and HCTZ 25 mg daily, continue to monitor blood pressure, follow-up in 4 to 6 weeks.  Healthcare maintenance, screening colonoscopy with risk and benefit discussed, he does have a history of diverticulitis in the recent past did not schedule  colonoscopy in 2016, new order was placed.  Anxiety controlled with as needed use of Xanax.  2. Patient Counseling:  --Nutrition: Stressed importance of moderation in sodium/caffeine intake, saturated fat and cholesterol, caloric balance, sufficient intake of fresh fruits, vegetables, fiber, calcium, iron.  --Discussed the issue of calcium supplement, and the daily use of baby aspirin.  --Exercise: Stressed the importance of regular exercise.   --Substance Abuse: Discussed cessation/primary prevention of tobacco, alcohol, or other drug use; driving or other dangerous activities under the influence; availability of treatment for abuse.    --Sexuality: Discussed sexually transmitted diseases, partner selection, use of condoms, avoidance of unintended pregnancy.  --Injury prevention: Discussed safety belts, safety helmets, smoke detector, smoking near bedding or upholstery.   --Dental health: Discussed importance of regular tooth brushing, flossing, and dental visits.  --Immunizations reviewed.  --Discussed timing and benefits of screening colonoscopy and alternative options.  --After hours service discussed with patient             -- I have had an Advance Directives discussion with the patient.  The following high BMI interventions were performed this visit: encouragement to exercise and weight loss from baseline weight    3. Discussed the patient's BMI with him.  The BMI is above average; BMI management plan is completed  4. Follow up as needed for acute illness     Subjective:      Matt Alfaro is a 55 y.o. male who  presents for an annual exam. The patient reports that there is not domestic violence in his life.   Blood pressure still mildly elevated with benazepril 20 HCTZ 12.5, denies any headache dizziness or chest pain.  Did not schedule colonoscopy back in 2016, he did have a bout of acute diverticulitis few months ago, we discussed the importance of screening colonoscopy follow-up.  About 10 pounds voluntarily lost with exercise and diet, patient was congratulated.  Healthy Habits:   Regular Exercise: Yes  Sunscreen Use: Yes  Healthy Diet: Yes  Dental Visits Regularly: Yes  Seat Belt: Yes  Sexually active: Yes  Monthly Self Testicular Exams:  Yes  Hemoccults: N/A  Flex Sig: N/A  Colonoscopy: No  Lipid Profile: Yes  Glucose Screen: Yes  Prevention of Osteoporosis: Yes  Last Dexa: N/A  Guns at Home:  N/A      Immunization History   Administered Date(s) Administered     INFLUENZA,RECOMBINANT,INJ,PF QUADRIVALENT 18+YRS 10/29/2020     INFLUENZA,SEASONAL QUAD, PF, =/> 6months 12/09/2019     Pneumo Polysac 23-V 12/09/2019     Tdap 01/19/2012     Immunization status: stated as current, but no records available.  Vision Screening:both eyes  Hearing: PASS     Current Outpatient Medications   Medication Sig Dispense Refill     docosahexanoic acid/epa (FISH OIL ORAL) Take by mouth.       ALPRAZolam (XANAX) 0.5 MG tablet TAKE 1 TABLET(0.5 MG) BY MOUTH DAILY AS NEEDED FOR ANXIETY 10 tablet 0     benazepril-hydrochlorthiazide (LOTENSIN HCT) 20-25 mg per tablet Take 1 tablet by mouth daily. 90 tablet 3     UNABLE TO FIND Med Name:flax seed/fiber       No current facility-administered medications for this visit.      No past medical history on file.  No past surgical history on file.  Patient has no known allergies.  Family History   Problem Relation Age of Onset     Arthritis Mother      Cancer Mother      Depression Mother      Early death Father      Heart disease Father      Hypertension Sister      Hypertension Brother      Social  History     Socioeconomic History     Marital status: Life Partner     Spouse name: Not on file     Number of children: Not on file     Years of education: Not on file     Highest education level: Not on file   Occupational History     Not on file   Social Needs     Financial resource strain: Not on file     Food insecurity     Worry: Not on file     Inability: Not on file     Transportation needs     Medical: Not on file     Non-medical: Not on file   Tobacco Use     Smoking status: Current Every Day Smoker     Smokeless tobacco: Never Used   Substance and Sexual Activity     Alcohol use: Not on file     Drug use: Not on file     Sexual activity: Not on file   Lifestyle     Physical activity     Days per week: Not on file     Minutes per session: Not on file     Stress: Not on file   Relationships     Social connections     Talks on phone: Not on file     Gets together: Not on file     Attends Latter day service: Not on file     Active member of club or organization: Not on file     Attends meetings of clubs or organizations: Not on file     Relationship status: Not on file     Intimate partner violence     Fear of current or ex partner: Not on file     Emotionally abused: Not on file     Physically abused: Not on file     Forced sexual activity: Not on file   Other Topics Concern     Not on file   Social History Narrative     Not on file     No specialty comments available.  Review of Systems  General:  Denies problem  Eyes: Denies problem  Ears/Nose/Throat: Denies problem  Cardiovascular: Denies problem  Respiratory:  Denies problem  Gastrointestinal:  Denies problem  Genitourinary: Denies problem  Musculoskeletal:  Denies problem  Skin: Denies problem  Neurologic: Denies problem  Psychiatric: Denies problem  Endocrine: Denies problem  Heme/Lymphatic: Denies problem   Allergic/Immunologic: Denies problem        Objective:     Vitals:    10/29/20 1127 10/29/20 1131   BP: 152/89 140/87   Pulse: 83 73   Resp: (!) 99   "  SpO2: 97%    Weight: 171 lb (77.6 kg)    Height: 5' 9.29\" (1.76 m)      Body mass index is 25.04 kg/m .    Physical  General Appearance: Alert, cooperative, no distress, appears stated age  Head: Normocephalic, without obvious abnormality, atraumatic  Eyes: PERRL, conjunctiva/corneas clear, EOM's intact  Ears: Normal TM's and external ear canals, both ears  Nose: Nares normal, septum midline,mucosa normal, no drainage  Throat: Lips, mucosa, and tongue normal; teeth and gums normal  Neck: Supple, symmetrical, trachea midline, no adenopathy;  thyroid: not enlarged, symmetric, no tenderness/mass/nodules; no carotid bruit or JVD  Back: Symmetric, no curvature, ROM normal, no CVA tenderness  Lungs: Clear to auscultation bilaterally, respirations unlabored  Heart: Regular rate and rhythm, S1 and S2 normal, no murmur, rub, or gallop,  Abdomen: Soft, non-tender, bowel sounds active all four quadrants,  no masses, no organomegaly  Musculoskeletal: Normal range of motion. No joint swelling or deformity.   Extremities: Extremities normal, atraumatic, no cyanosis or edema  Skin: Skin color, texture, turgor normal, no rashes or lesions  Lymph nodes: Cervical, supraclavicular, and axillary nodes normal  Neurologic: He is alert. He has normal reflexes.   Psychiatric: He has a normal mood and affect.        MD Matt Jamison was seen today for annual exam.    Diagnoses and all orders for this visit:    Visit for preventive health examination    Essential hypertension  -     HM1(CBC and Differential)  -     Comprehensive Metabolic Panel  -     Thyroid Cascade  -     benazepril-hydrochlorthiazide (LOTENSIN HCT) 20-25 mg per tablet; Take 1 tablet by mouth daily.    Mixed hyperlipidemia  -     Lipid Cascade  -     Lipoprotein A    Screening for HIV without presence of risk factors  -     HIV Antigen/Antibody Screening Cascade    Encounter for HCV screening test for low risk patient  -     Hepatitis C Antibody " (Anti-HCV)    Screening PSA (prostate specific antigen)  -     PSA (Prostatic-Specific Antigen), Annual Screen    Hypertension    Screening for colon cancer  -     Ambulatory referral for Colonoscopy    Diverticulosis of large intestine without hemorrhage  -     Ambulatory referral for Colonoscopy    Other orders  -     Varicella Zoster, Recombinant Vaccine IM; Standing  -     Influenza, Recombinant, Inj, Quadrivalent, PF, 18+YRS

## 2021-06-14 NOTE — PROGRESS NOTES
Assessment:      Healthy male exam.        Plan:     1.  Newly diagnosed hypertension, continue current treatment, continue with healthy lifestyle changes, follow-up in about 2-3 months.  Sooner if any new issues arise.  Anxiety, controlled with alprazolam as needed, possible side effect discussed.  2. Patient Counseling:  --Nutrition: Stressed importance of moderation in sodium/caffeine intake, saturated fat and cholesterol, caloric balance, sufficient intake of fresh fruits, vegetables, fiber, calcium, iron.  --Discussed the issue of calcium supplement, and the daily use of baby aspirin.  --Exercise: Stressed the importance of regular exercise.   --Substance Abuse: Discussed cessation/primary prevention of tobacco, alcohol, or other drug use; driving or other dangerous activities under the influence; availability of treatment for abuse.    --Sexuality: Discussed sexually transmitted diseases, partner selection, use of condoms, avoidance of unintended pregnancy.  --Injury prevention: Discussed safety belts, safety helmets, smoke detector, smoking near bedding or upholstery.   --Dental health: Discussed importance of regular tooth brushing, flossing, and dental visits.  --Immunizations reviewed.  --Discussed timing and benefits of screening colonoscopy and alternative options.  --After hours service discussed with patient             -- I have had an Advance Directives discussion with the patient.  The following high BMI interventions were performed this visit: encouragement to exercise and weight loss from baseline weight    3. Discussed the patient's BMI with him.  The BMI is above average; BMI management plan is completed  4. Follow up as needed for acute illness     Subjective:      Matt Alfaro is a 52 y.o. male who presents for an annual exam. The patient reports that there is not domestic violence in his life.   Was seen a couple weeks ago with elevated blood pressure, was about 180/110 at the dentist  office about 3 weeks ago.  Did not have any related symptoms.  Healthy Habits:   Regular Exercise: Yes  Sunscreen Use: Yes  Healthy Diet: Yes  Dental Visits Regularly: Yes  Seat Belt: Yes  Sexually active: Yes  Monthly Self Testicular Exams:  Yes  Hemoccults: N/A  Flex Sig: N/A  Colonoscopy: No and referred  Lipid Profile: Yes  Glucose Screen: Yes  Prevention of Osteoporosis: Yes  Last Dexa: N/A  Guns at Home:  N/A      Immunization History   Administered Date(s) Administered     Tdap 01/19/2012     Immunization status: stated as current, but no records available.  Vision Screening:both eyes  Hearing: PASS     Current Outpatient Prescriptions   Medication Sig Dispense Refill     lisinopril (PRINIVIL,ZESTRIL) 5 MG tablet Take 1 tablet (5 mg total) by mouth daily. 30 tablet 3     ALPRAZolam (XANAX) 0.5 MG tablet Take 1 tablet (0.5 mg total) by mouth daily as needed for anxiety. 10 tablet 0     hydrOXYzine (ATARAX) 25 MG tablet Take 1 tablet (25 mg total) by mouth every 8 (eight) hours as needed for anxiety. 30 tablet 1     No current facility-administered medications for this visit.      No past medical history on file.  No past surgical history on file.  Review of patient's allergies indicates no known allergies.  Family History   Problem Relation Age of Onset     Arthritis Mother      Cancer Mother      Depression Mother      Early death Father      Heart disease Father      Hypertension Sister      Hypertension Brother      Social History     Social History     Marital status: Life Partner     Spouse name: N/A     Number of children: N/A     Years of education: N/A     Occupational History     Not on file.     Social History Main Topics     Smoking status: Current Every Day Smoker     Smokeless tobacco: Not on file     Alcohol use Not on file     Drug use: Not on file     Sexual activity: Not on file     Other Topics Concern     Not on file     Social History Narrative     No specialty comments available.  Review  "of Systems  General:  Denies problem  Eyes: Denies problem  Ears/Nose/Throat: Denies problem  Cardiovascular: Denies problem  Respiratory:  Denies problem  Gastrointestinal:  Denies problem  Genitourinary: Denies problem  Musculoskeletal:  Denies problem  Skin: Denies problem  Neurologic: Denies problem  Psychiatric: Denies problem  Endocrine: Denies problem  Heme/Lymphatic: Denies problem   Allergic/Immunologic: Denies problem        Objective:     Vitals:    12/18/17 1147   BP: 138/78   Pulse: 72   Resp: 13   Temp: 99  F (37.2  C)   TempSrc: Oral   Weight: 176 lb (79.8 kg)   Height: 5' 9\" (1.753 m)     Body mass index is 25.99 kg/(m^2).    Physical  General Appearance: Alert, cooperative, no distress, appears stated age  Head: Normocephalic, without obvious abnormality, atraumatic  Eyes: PERRL, conjunctiva/corneas clear, EOM's intact  Ears: Normal TM's and external ear canals, both ears  Nose: Nares normal, septum midline,mucosa normal, no drainage  Throat: Lips, mucosa, and tongue normal; teeth and gums normal  Neck: Supple, symmetrical, trachea midline, no adenopathy;  thyroid: not enlarged, symmetric, no tenderness/mass/nodules; no carotid bruit or JVD  Back: Symmetric, no curvature, ROM normal, no CVA tenderness  Lungs: Clear to auscultation bilaterally, respirations unlabored  Heart: Regular rate and rhythm, S1 and S2 normal, no murmur, rub, or gallop,  Abdomen: Soft, non-tender, bowel sounds active all four quadrants,  no masses, no organomegaly  Genitourinary: Penis normal. Right and left testis are descended.No palpable masses.   Rectal: No visible external lesion  Musculoskeletal: Normal range of motion. No joint swelling or deformity.   Extremities: Extremities normal, atraumatic, no cyanosis or edema  Skin: Skin color, texture, turgor normal, no rashes or lesions  Lymph nodes: Cervical, supraclavicular, and axillary nodes normal  Neurologic: He is alert. He has normal reflexes.   Psychiatric: He has a " normal mood and affect.        MD Matt Jamison was seen today for annual exam and follow-up.    Diagnoses and all orders for this visit:    Visit for preventive health examination    Screening for endocrine, nutritional, metabolic and immunity disorder  -     Lipid Cascade RANDOM; Future  -     Lipid Cascade RANDOM  -     Cancel: Vitamin B12  -     Vitamin B12    Hypertension  -     Microalbumin, Random Urine; Future  -     Uric Acid; Future  -     Comprehensive Metabolic Panel; Future  -     HM2(CBC w/o Differential); Future  -     lisinopril (PRINIVIL,ZESTRIL) 5 MG tablet; Take 1 tablet (5 mg total) by mouth daily.  -     Uric Acid  -     Comprehensive Metabolic Panel  -     HM2(CBC w/o Differential)  -     Microalbumin, Random Urine    Screening PSA (prostate specific antigen)  -     PSA, Annual Screen (Prostatic-Specific Antigen); Future  -     PSA, Annual Screen (Prostatic-Specific Antigen)    Overweight (BMI 25.0-29.9)  -     Thyroid Stimulating Hormone (TSH); Future  -     Glycosylated Hemoglobin A1c; Future  -     High Sensitivity C-Reactive Protein(hsCR; Future  -     Thyroid Stimulating Hormone (TSH)  -     Glycosylated Hemoglobin A1c  -     High Sensitivity C-Reactive Protein(hsCR    Anxiety  -     ALPRAZolam (XANAX) 0.5 MG tablet; Take 1 tablet (0.5 mg total) by mouth daily as needed for anxiety.    Screen for colon cancer  -     Ambulatory referral for Colonoscopy

## 2021-06-14 NOTE — PROGRESS NOTES
Assessment/Plan:        Diagnoses and all orders for this visit:    HTN (hypertension) with goal to be determined      Set of hypertension in this 52-year-old man.  He does have a significant family history of his brother sister father.  He left before we could get labs.  We will start him on lisinopril at low-dose 5 mg 1 tab p.o. daily.  I have asked him to follow-up with Dr. Guy DICKSON in 1-2 weeks.  Will plan to draw labs at that time  Including heme 1, CMP, thyroid studies.  We went over side effects of this and questions were answered.        Subjective:    Patient ID: Matt Alfaro is a 52 y.o. male.    Hypertension   This is a new problem. The current episode started 1 to 4 weeks ago. The problem is unchanged. The problem is uncontrolled. Associated symptoms include anxiety. Pertinent negatives include no blurred vision, chest pain, headaches, malaise/fatigue, orthopnea, palpitations or shortness of breath. There are no associated agents to hypertension. Risk factors for coronary artery disease include family history and male gender. Past treatments include nothing. nnone.       The following portions of the patient's history were reviewed and updated as appropriate: allergies, current medications, past family history, past medical history, past social history, past surgical history and problem list.    Review of Systems   Constitutional: Negative for activity change, fatigue, malaise/fatigue and unexpected weight change.   Eyes: Negative for blurred vision.   Respiratory: Negative for cough and shortness of breath.    Cardiovascular: Negative for chest pain, palpitations, orthopnea and leg swelling.   Neurological: Negative for speech difficulty, light-headedness, numbness and headaches.   All other systems reviewed and are negative.            Objective:    Physical Exam   Constitutional: He is oriented to person, place, and time. He appears well-developed and well-nourished. He appears distressed.   Neck:  Normal range of motion. Neck supple. No thyromegaly present.   Cardiovascular: Normal rate, regular rhythm, normal heart sounds and intact distal pulses.  Exam reveals no gallop and no friction rub.    No murmur heard.  Pulmonary/Chest: Effort normal and breath sounds normal. No respiratory distress. He has no wheezes. He has no rales.   Lymphadenopathy:     He has no cervical adenopathy.   Neurological: He is alert and oriented to person, place, and time.   Skin: Skin is warm and dry.   Nursing note and vitals reviewed.

## 2021-06-16 PROBLEM — I10 ESSENTIAL HYPERTENSION: Status: ACTIVE | Noted: 2017-12-03

## 2021-06-16 PROBLEM — E78.2 MIXED HYPERLIPIDEMIA: Status: ACTIVE | Noted: 2018-10-11

## 2021-06-16 PROBLEM — K57.30 DIVERTICULOSIS OF LARGE INTESTINE: Status: ACTIVE | Noted: 2020-10-30

## 2021-06-20 ENCOUNTER — HEALTH MAINTENANCE LETTER (OUTPATIENT)
Age: 56
End: 2021-06-20

## 2021-06-20 NOTE — PROGRESS NOTES
Assessment:      Healthy male exam.        Plan:     1.  Hypertension, uncontrolled with lisinopril 10 mg daily, will change to Lotensin, have him continue to check 3 times a week, keep a log and follow-up in about 4 weeks.  Screening colonoscopy option discussed, with risk and benefit, patient would like to get a [place] guard, order will be sent.  Anxiety appears stable with alprazolam as needed, worsening signs symptoms reviewed.  2. Patient Counseling:  --Nutrition: Stressed importance of moderation in sodium/caffeine intake, saturated fat and cholesterol, caloric balance, sufficient intake of fresh fruits, vegetables, fiber, calcium, iron.  --Discussed the issue of calcium supplement, and the daily use of baby aspirin.  --Exercise: Stressed the importance of regular exercise.   --Substance Abuse: Discussed cessation/primary prevention of tobacco, alcohol, or other drug use; driving or other dangerous activities under the influence; availability of treatment for abuse.    --Sexuality: Discussed sexually transmitted diseases, partner selection, use of condoms, avoidance of unintended pregnancy.  --Injury prevention: Discussed safety belts, safety helmets, smoke detector, smoking near bedding or upholstery.   --Dental health: Discussed importance of regular tooth brushing, flossing, and dental visits.  --Immunizations reviewed.  --Discussed timing and benefits of screening colonoscopy and alternative options.  --After hours service discussed with patient             -- I have had an Advance Directives discussion with the patient.  The following high BMI interventions were performed this visit: encouragement to exercise and weight loss from baseline weight    3. Discussed the patient's BMI with him.  The BMI is above average; BMI management plan is completed  4. Follow up as needed for acute illness     Subjective:      Matt Alfaro is a 53 y.o. male who presents for an annual exam. The patient reports that  there is not domestic violence in his life.   Blood pressure still elevated with lisinopril 10 mg daily, denies any dizziness, headaches or shortness of breath.  Screening colonoscopy option discussed with risk and benefit.  Anxiety, controlled with as needed alprazolam, no worsening symptoms.  Healthy Habits:   Regular Exercise: Yes  Sunscreen Use: Yes  Healthy Diet: Yes  Dental Visits Regularly: Yes  Seat Belt: Yes  Sexually active: Yes  Monthly Self Testicular Exams:  Yes  Hemoccults: N/A  Flex Sig: N/A  Colonoscopy: No  Lipid Profile: Yes  Glucose Screen: Yes  Prevention of Osteoporosis: Yes  Last Dexa: N/A  Guns at Home:  N/A      Immunization History   Administered Date(s) Administered     Tdap 01/19/2012     Immunization status: stated as current, but no records available.  Vision Screening:both eyes  Hearing: PASS     Current Outpatient Prescriptions   Medication Sig Dispense Refill     ALPRAZolam (XANAX) 0.5 MG tablet Take 1 tablet (0.5 mg total) by mouth daily as needed for anxiety. 10 tablet 0     benazepril-hydrochlorthiazide (LOTENSIN HCT) 10-12.5 mg per tablet Take 1 tablet by mouth daily. 30 tablet 4     No current facility-administered medications for this visit.      No past medical history on file.  No past surgical history on file.  Review of patient's allergies indicates no known allergies.  Family History   Problem Relation Age of Onset     Arthritis Mother      Cancer Mother      Depression Mother      Early death Father      Heart disease Father      Hypertension Sister      Hypertension Brother      Social History     Social History     Marital status: Life Partner     Spouse name: N/A     Number of children: N/A     Years of education: N/A     Occupational History     Not on file.     Social History Main Topics     Smoking status: Current Every Day Smoker     Smokeless tobacco: Never Used     Alcohol use Not on file     Drug use: Not on file     Sexual activity: Not on file     Other Topics  "Concern     Not on file     Social History Narrative     No specialty comments available.  Review of Systems  General:  Denies problem  Eyes: Denies problem  Ears/Nose/Throat: Denies problem  Cardiovascular: Denies problem  Respiratory:  Denies problem  Gastrointestinal:  Denies problem  Genitourinary: Denies problem  Musculoskeletal:  Denies problem  Skin: Denies problem  Neurologic: Denies problem  Psychiatric: Denies problem  Endocrine: Denies problem  Heme/Lymphatic: Denies problem   Allergic/Immunologic: Denies problem        Objective:     Vitals:    10/11/18 0955 10/11/18 0957   BP: 142/90 134/86   Pulse: 60    Resp: 12    Temp: 98.7  F (37.1  C)    TempSrc: Oral    Weight: 176 lb (79.8 kg)    Height: 5' 9\" (1.753 m)      Body mass index is 25.99 kg/(m^2).    Physical  General Appearance: Alert, cooperative, no distress, appears stated age  Head: Normocephalic, without obvious abnormality, atraumatic  Eyes: PERRL, conjunctiva/corneas clear, EOM's intact  Ears: Normal TM's and external ear canals, both ears  Nose: Nares normal, septum midline,mucosa normal, no drainage  Throat: Lips, mucosa, and tongue normal; teeth and gums normal  Neck: Supple, symmetrical, trachea midline, no adenopathy;  thyroid: not enlarged, symmetric, no tenderness/mass/nodules; no carotid bruit or JVD  Back: Symmetric, no curvature, ROM normal, no CVA tenderness  Lungs: Clear to auscultation bilaterally, respirations unlabored  Heart: Regular rate and rhythm, S1 and S2 normal, no murmur, rub, or gallop,  Abdomen: Soft, non-tender, bowel sounds active all four quadrants,  no masses, no organomegaly  Genitourinary: Penis normal. Right and left testis are descended.No palpable masses.   Rectal: No visible external lesion, soft symmetrical normal size with no palpable nodule prostate  Musculoskeletal: Normal range of motion. No joint swelling or deformity.   Extremities: Extremities normal, atraumatic, no cyanosis or edema  Skin: Skin " color, texture, turgor normal, no rashes or lesions  Lymph nodes: Cervical, supraclavicular, and axillary nodes normal  Neurologic: He is alert. He has normal reflexes.   Psychiatric: He has a normal mood and affect.        MD Matt Jamison was seen today for annual exam, blood pressure check and labs only.    Diagnoses and all orders for this visit:    Visit for preventive health examination    Benign essential hypertension  -     Comprehensive Metabolic Panel    Anxiety  -     ALPRAZolam (XANAX) 0.5 MG tablet; Take 1 tablet (0.5 mg total) by mouth daily as needed for anxiety.    Mixed hyperlipidemia  -     Lipid Cascade FASTING    Overweight (BMI 25.0-29.9)  -     Comprehensive Metabolic Panel  -     Glycosylated Hemoglobin A1c  -     Thyroid Stimulating Hormone (TSH)    Other orders  -     Cancel: ALPRAZolam (XANAX) 0.5 MG tablet; Take 1 tablet (0.5 mg total) by mouth daily as needed for anxiety.  -     benazepril-hydrochlorthiazide (LOTENSIN HCT) 10-12.5 mg per tablet; Take 1 tablet by mouth daily.

## 2021-06-23 NOTE — TELEPHONE ENCOUNTER
Refill Approved    Rx renewed per Medication Renewal Policy. Medication was last renewed on 10/11/18.    Nathalie Huizar, [facility] Triage/Med Refill 1/29/2019     Requested Prescriptions   Pending Prescriptions Disp Refills     benazepril-hydrochlorthiazide (LOTENSIN HCT) 10-12.5 mg per tablet [Pharmacy Med Name: BENAZEPRIL/HCTZ 10/12.5MG TABLETS] 30 tablet 0     Sig: TAKE 1 TABLET BY MOUTH DAILY    Diuretics/Combination Diuretics Refill Protocol  Passed - 1/28/2019  1:18 PM       Passed - Visit with PCP or prescribing provider visit in past 12 months    Last office visit with prescriber/PCP: 6/28/2016 Tj Early MD OR same dept: Visit date not found OR same specialty: 12/1/2017 Tata Manuel MD  Last physical: 10/11/2018 Last MTM visit: Visit date not found   Next visit within 3 mo: Visit date not found  Next physical within 3 mo: Visit date not found  Prescriber OR PCP: Tj Early MD  Last diagnosis associated with med order: There are no diagnoses linked to this encounter.  If protocol passes may refill for 12 months if within 3 months of last provider visit (or a total of 15 months).            Passed - Serum Potassium in past 12 months     Lab Results   Component Value Date    Potassium 4.1 10/11/2018            Passed - Serum Sodium in past 12 months     Lab Results   Component Value Date    Sodium 140 10/11/2018            Passed - Blood pressure on file in past 12 months    BP Readings from Last 1 Encounters:   10/11/18 134/86            Passed - Serum Creatinine in past 12 months     Creatinine   Date Value Ref Range Status   10/11/2018 0.80 0.70 - 1.30 mg/dL Final

## 2021-07-13 ENCOUNTER — MYC MEDICAL ADVICE (OUTPATIENT)
Dept: FAMILY MEDICINE | Facility: CLINIC | Age: 56
End: 2021-07-13

## 2021-07-13 DIAGNOSIS — I10 ESSENTIAL HYPERTENSION: ICD-10-CM

## 2021-07-13 RX ORDER — BENAZEPRIL/HYDROCHLOROTHIAZIDE 20 MG-25MG
1 TABLET ORAL DAILY
Qty: 90 TABLET | Refills: 3 | Status: SHIPPED | OUTPATIENT
Start: 2021-07-13 | End: 2022-04-12

## 2021-07-14 ENCOUNTER — MYC MEDICAL ADVICE (OUTPATIENT)
Dept: FAMILY MEDICINE | Facility: CLINIC | Age: 56
End: 2021-07-14

## 2021-09-08 DIAGNOSIS — E78.5 HYPERLIPIDEMIA LDL GOAL <130: ICD-10-CM

## 2021-09-08 RX ORDER — ATORVASTATIN CALCIUM 10 MG/1
10 TABLET, FILM COATED ORAL DAILY
Start: 2021-09-08

## 2021-09-08 NOTE — TELEPHONE ENCOUNTER
"Atorvastatin/ LIPITOR    Routing refill request to provider for review/approval because:  Medication filled 11/02/2020, #30, 11 refills    Last Written Prescription Date:  11/02/2020  Last Fill Quantity: 30,  # refills: 11   Last office visit provider:  RACHNA with Dr. Early on 10/29/2020     Per [facility]:   Disp Refills Start End NILDA   atorvastatin (LIPITOR) 10 MG tablet 30 tablet 11 11/2/2020  --   Sig - Route: Take 1 tablet (10 mg total) by mouth daily. - Oral   Sent to pharmacy as: atorvastatin 10 mg tablet (Lipitor)   E-Prescribing Status: Receipt confirmed by pharmacy (11/2/2020  3:51 PM CST)   atorvastatin (LIPITOR) 10 MG tablet [[id number]]    Electronically signed by: Tj Early MD on 11/02/20 4070 Status: Active   Ordering user: Tj Early MD 11/02/20 6780 Authorized by: Tj Early MD   Frequency: DAILY 11/02/20 - Until Discontinued   Diagnoses  Hyperlipidemia LDL goal <130 [E78.5]       Requested Prescriptions   Pending Prescriptions Disp Refills     atorvastatin (LIPITOR) 10 MG tablet 30 tablet 11     Sig: Take 1 tablet (10 mg) by mouth daily       Statins Protocol Passed - 9/8/2021 11:08 AM        Passed - LDL on file in past 12 months     Recent Labs   Lab Test 10/29/20  1229   *             Passed - No abnormal creatine kinase in past 12 months     No lab results found.             Passed - Recent (12 mo) or future (30 days) visit within the authorizing provider's specialty     Patient has had an office visit with the authorizing provider or a provider within the authorizing providers department within the previous 12 mos or has a future within next 30 days. See \"Patient Info\" tab in inbasket, or \"Choose Columns\" in Meds & Orders section of the refill encounter.              Passed - Medication is active on med list        Passed - Patient is age 18 or older             Duy Wilson RN 09/08/21 1:44 PM  "

## 2021-09-14 ENCOUNTER — MYC MEDICAL ADVICE (OUTPATIENT)
Dept: FAMILY MEDICINE | Facility: CLINIC | Age: 56
End: 2021-09-14

## 2021-09-14 DIAGNOSIS — E78.5 HYPERLIPIDEMIA LDL GOAL <130: ICD-10-CM

## 2021-09-14 RX ORDER — ATORVASTATIN CALCIUM 10 MG/1
10 TABLET, FILM COATED ORAL DAILY
Qty: 30 TABLET | Refills: 11 | Status: SHIPPED | OUTPATIENT
Start: 2021-09-14 | End: 2022-07-27

## 2021-10-11 ENCOUNTER — HEALTH MAINTENANCE LETTER (OUTPATIENT)
Age: 56
End: 2021-10-11

## 2021-11-04 ENCOUNTER — OFFICE VISIT (OUTPATIENT)
Dept: FAMILY MEDICINE | Facility: CLINIC | Age: 56
End: 2021-11-04
Payer: COMMERCIAL

## 2021-11-04 VITALS
TEMPERATURE: 97.3 F | BODY MASS INDEX: 24.44 KG/M2 | OXYGEN SATURATION: 99 % | HEART RATE: 84 BPM | DIASTOLIC BLOOD PRESSURE: 89 MMHG | SYSTOLIC BLOOD PRESSURE: 144 MMHG | HEIGHT: 69 IN | WEIGHT: 165 LBS | RESPIRATION RATE: 16 BRPM

## 2021-11-04 DIAGNOSIS — Z87.891 PERSONAL HISTORY OF TOBACCO USE: ICD-10-CM

## 2021-11-04 DIAGNOSIS — F41.9 ANXIETY: ICD-10-CM

## 2021-11-04 DIAGNOSIS — F17.200 NICOTINE DEPENDENCE, UNCOMPLICATED, UNSPECIFIED NICOTINE PRODUCT TYPE: ICD-10-CM

## 2021-11-04 DIAGNOSIS — I10 ESSENTIAL HYPERTENSION: ICD-10-CM

## 2021-11-04 DIAGNOSIS — F17.210 CIGARETTE NICOTINE DEPENDENCE WITHOUT COMPLICATION: ICD-10-CM

## 2021-11-04 DIAGNOSIS — E78.2 MIXED HYPERLIPIDEMIA: ICD-10-CM

## 2021-11-04 DIAGNOSIS — Z00.00 VISIT FOR PREVENTIVE HEALTH EXAMINATION: Primary | ICD-10-CM

## 2021-11-04 DIAGNOSIS — Z12.5 SCREENING FOR PROSTATE CANCER: ICD-10-CM

## 2021-11-04 LAB
ALBUMIN SERPL-MCNC: 4.7 G/DL (ref 3.5–5)
ALP SERPL-CCNC: 85 U/L (ref 45–120)
ALT SERPL W P-5'-P-CCNC: 27 U/L (ref 0–45)
ANION GAP SERPL CALCULATED.3IONS-SCNC: 12 MMOL/L (ref 5–18)
AST SERPL W P-5'-P-CCNC: 26 U/L (ref 0–40)
BILIRUB SERPL-MCNC: 0.9 MG/DL (ref 0–1)
BUN SERPL-MCNC: 6 MG/DL (ref 8–22)
CALCIUM SERPL-MCNC: 10.2 MG/DL (ref 8.5–10.5)
CHLORIDE BLD-SCNC: 98 MMOL/L (ref 98–107)
CHOLEST SERPL-MCNC: 163 MG/DL
CO2 SERPL-SCNC: 26 MMOL/L (ref 22–31)
CREAT SERPL-MCNC: 0.84 MG/DL (ref 0.7–1.3)
FASTING STATUS PATIENT QL REPORTED: ABNORMAL
GFR SERPL CREATININE-BSD FRML MDRD: >90 ML/MIN/1.73M2
GLUCOSE BLD-MCNC: 97 MG/DL (ref 70–125)
HDLC SERPL-MCNC: 55 MG/DL
LDLC SERPL CALC-MCNC: 72 MG/DL
POTASSIUM BLD-SCNC: 4.3 MMOL/L (ref 3.5–5)
PROT SERPL-MCNC: 7.7 G/DL (ref 6–8)
PSA SERPL-MCNC: 0.57 UG/L (ref 0–3.5)
SODIUM SERPL-SCNC: 136 MMOL/L (ref 136–145)
TRIGL SERPL-MCNC: 178 MG/DL

## 2021-11-04 PROCEDURE — 99396 PREV VISIT EST AGE 40-64: CPT | Performed by: FAMILY MEDICINE

## 2021-11-04 PROCEDURE — 80061 LIPID PANEL: CPT | Performed by: FAMILY MEDICINE

## 2021-11-04 PROCEDURE — 36415 COLL VENOUS BLD VENIPUNCTURE: CPT | Performed by: FAMILY MEDICINE

## 2021-11-04 PROCEDURE — 80053 COMPREHEN METABOLIC PANEL: CPT | Performed by: FAMILY MEDICINE

## 2021-11-04 PROCEDURE — G0103 PSA SCREENING: HCPCS | Performed by: FAMILY MEDICINE

## 2021-11-04 RX ORDER — ALPRAZOLAM 0.5 MG
TABLET ORAL
Qty: 10 TABLET | Refills: 1 | Status: SHIPPED | OUTPATIENT
Start: 2021-11-04 | End: 2022-07-27

## 2021-11-04 ASSESSMENT — MIFFLIN-ST. JEOR: SCORE: 1568.43

## 2021-11-04 NOTE — PATIENT INSTRUCTIONS
HOW TO QUIT SMOKING  Smoking is one of the hardest habits to break. About half of all those who have ever smoked have been able to quit, and most of those (about 70%) who still smoke want to quit. Here are some of the best ways to stop smoking.     KEEP TRYING:  It takes most smokers about 8 tries before they are finally able to fully quit. So, the more often you try and fail, the better your chance of quitting the next time! So, don't give up!    GO COLD TURKEY:  Most ex-smokers quit cold turkey. Trying to cut back gradually doesn't seem to work as well, perhaps because it continues the smoking habit. Also, it is possible to fool yourself by inhaling more while smoking fewer cigarettes. This results in the same amount of nicotine in your body!    GET SUPPORT:  Support programs can make an important difference, especially for the heavy smoker. These groups offer lectures, methods to change your behavior and peer support. Call the free national Quitline for more information. 800-QUIT-NOW (372-558-0832). Low-cost or free programs are offered by many hospitals, local chapters of the American Lung Association (847-730-1337) and the American Cancer Society (035-443-1592). Support at home is important too. Non-smokers can help by offering praise and encouragement. If the smoker fails to quit, encourage them to try again!    OVER-THE-COUNTER MEDICINES:  For those who can't quit on their own, Nicotine Replacement Therapy (NRT) may make quitting much easier. Certain aids such as the nicotine patch, gum and lozenge are available without a prescription. However, it is best to use these under the guidance of your doctor. The skin patch provides a steady supply of nicotine to the body. Nicotine gum and lozenge gives temporary bursts of low levels of nicotine. Both methods take the edge off the craving for cigarettes. WARNING: If you feel symptoms of nicotine overdose, such as nausea, vomiting, dizziness, weakness, or fast  heartbeat, stop using these and see your doctor.    PRESCRIPTION MEDICINES:  After evaluating your smoking patterns and prior attempts at quitting, your doctor may offer a prescription medicine such as bupropion (Zyban, Wellbutrin), varenicline (Chantix, Champix), a niocotine inhaler or nasal spray. Each has its unique advantage and side effects which your doctor can review with you.    HEALTH BENEFITS OF QUITTING:  The benefits of quitting start right away and keep improving the longer you go without smokin minutes: blood pressure and pulse return to normal  8 hours: oxygen levels return to normal  2 days: ability to smell and taste begins to improve as damaged nerves start to regrow  2-3 weeks: circulation and lung function improves  1-9 months: decreased cough, congestion and shortness of breath; less tired  1 year: risk of heart attack decreases by half  5 years: risk of lung cancer decreases by half; risk of stroke becomes the same as a non-smoker  For information about how to quit smoking, visit the following links:  National Cancer [place] ,   Clearing the Air, Quit Smoking Today   - an online booklet. http://www.smokefree.gov/pubs/clearing_the_air.pdf  Smokefree.gov http://smokefree.gov/  QuitNet http://www.quitnet.com/    3307-2915 [employer], [address] [place] [address], [place] [address]. All rights reserved. This information is not intended as a substitute for professional medical care. Always follow your healthcare professional's instructions.    Lung Cancer Screening   Frequently Asked Questions  If you are at high-risk for lung cancer, getting screened with low-dose computed tomography (LDCT) every year can help save your life. This handout offers answers to some of the most common questions about lung cancer screening. If you have other questions, please call 3-426-7-PCancer ([phone]).     What is it?  Lung cancer screening uses special X-ray technology to create an image of your  lung tissue. The exam is quick and easy and takes less than 10 seconds. We don t give you any medicine or use any needles. You can eat before and after the exam. You don t need to change your clothes as long as the clothing on your chest doesn t contain metal. But, you do need to be able to hold your breath for at least 6 seconds during the exam.    What is the goal of lung cancer screening?  The goal of lung cancer screening is to save lives. Many times, lung cancer is not found until a person starts having physical symptoms. Lung cancer screening can help detect lung cancer in the earliest stages when it may be easier to treat.    Who should be screened for lung cancer?  We suggest lung cancer screening for anyone who is at high-risk for lung cancer. You are in the high-risk group if you:      are between the ages of 55 and 79, and    have smoked at least 1 pack of cigarettes a day for 30 or more years, and    still smoke or have quit within the past 15 years.    However, if you have a new cough or shortness of breath, you should talk to your doctor before being screened.    Some national lung health advocacy groups also recommend screening for people ages 50 to 79 who have smoked an average of 1 pack of cigarettes a day for 20 years. They must also have at least 1 other risk factor for lung cancer, not including exposure to secondhand smoke. Other risk factors are having had cancer in the past, emphysema, pulmonary fibrosis, COPD, a family history of lung cancer, or exposure to certain materials such as arsenic, asbestos, beryllium, cadmium, chromium, diesel fumes, nickel, radon or silica. Your care team can help you know if you have one of these risk factors.     Why does it matter if I have symptoms?  Certain symptoms can be a sign that you have a condition in your lungs that should be checked and treated by your doctor. These symptoms include fever, chest pain, a new or changing cough, shortness of breath that  you have never felt before, coughing up blood or unexplained weight loss. Having any of these symptoms can greatly affect the results of lung cancer screening.       Should all smokers get an LDCT lung cancer screening exam?  It depends. Lung cancer screening is for a very specific group of men and women who have a history of heavy smoking over a long period of time (see  Who should be screened for lung cancer  above).  I am in the high-risk group, but have been diagnosed with cancer in the past. Is LDCT lung cancer screening right for me?  In some cases, you should not have LDCT lung screening, such as when your doctor is already following your cancer with CT scan studies. Your doctor will help you decide if LDCT lung screening is right for you.  Do I need to have a screening exam every year?  Yes. If you are in the high-risk group described earlier, you should get an LDCT lung cancer screening exam every year until you are 79, or are no longer willing or able to undergo screening and possible procedures to diagnose and treat lung cancer.  How effective is LDCT at preventing death from lung cancer?  Studies have shown that LDCT lung cancer screening can lower the risk of death from lung cancer by 20 percent in people who are at high-risk.  What are the risks?  There are some risks and limitations of LDCT lung cancer screening. We want to make sure you understand the risks and benefits, so please let us know if you have any questions. Your doctor may want to talk with you more about these risks.    Radiation exposure: As with any exam that uses radiation, there is a very small increased risk of cancer. The amount of radiation in LDCT is small--about the same amount a person would get from a mammogram. Your doctor orders the exam when he or she feels the potential benefits outweigh the risks.    False negatives: No test is perfect, including LDCT. It is possible that you may have a medical condition, including lung  cancer, that is not found during your exam. This is called a false negative result.    False positives and more testing: LDCT very often finds something in the lung that could be cancer, but in fact is not. This is called a false positive result. False positive tests often cause anxiety. To make sure these findings are not cancer, you may need to have more tests. These tests will be done only if you give us permission. Sometimes patients need a treatment that can have side effects, such as a biopsy. For more information on false positives, see  What can I expect from the results?     Findings not related to lung cancer: Your LDCT exam also takes pictures of areas of your body next to your lungs. In a very small number of cases, the CT scan will show an abnormal finding in one of these areas, such as your kidneys, adrenal glands, liver or thyroid. This finding may not be serious, but you may need more tests. Your doctor can help you decide what other tests you may need, if any.  What can I expect from the results?  About 1 out of 4 LDCT exams will find something that may need more tests. Most of the time, these findings are lung nodules. Lung nodules are very small collections of tissue in the lung. These nodules are very common, and the vast majority--more than 97 percent--are not cancer (benign). Most are normal lymph nodes or small areas of scarring from past infections.  But, if a small lung nodule is found to be cancer, the cancer can be cured more than 90 percent of the time. To know if the nodule is cancer, we may need to get more images before your next yearly screening exam. If the nodule has suspicious features (for example, it is large, has an odd shape or grows over time), we will refer you to a specialist for further testing.  Will my doctor also get the results?  Yes. Your doctor will get a copy of your results.  Is it okay to keep smoking now that there s a cancer screening exam?  No. Tobacco is one of  the strongest cancer-causing agents. It causes not only lung cancer, but other cancers and cardiovascular (heart) diseases as well. The damage caused by smoking builds over time. This means that the longer you smoke, the higher your risk of disease. While it is never too late to quit, the sooner you quit, the better.  Where can I find help to quit smoking?  The best way to prevent lung cancer is to stop smoking. If you have already quit smoking, congratulations and keep it up! For help on quitting smoking, please call [employer] at 7-995-835-EZCS ([phone]) or the American Cancer Society at 1-878.779.3369 to find local resources near you.  One-on-one health coaching:  If you d prefer to work individually with a health care provider on tobacco cessation, we offer:      Medication Therapy Management:  Our specially trained pharmacists work closely with you and your doctor to help you quit smoking.  Call [phone] or [phone] (toll free).     Can Do: Health coaching offered by [facility].  [email or web address]

## 2021-11-04 NOTE — PROGRESS NOTES
SUBJECTIVE:   CC: Matt Alfaro is an 56 year old male who presents for preventative health visit.     {Split Bill scripting  The purpose of this visit is to discuss your medical history and prevent health problems before you are sick. You may be responsible for a co-pay, coinsurance, or deductible if your visit today includes services such as checking on a sore throat, having an x-ray or lab test, or treating and evaluating a new or existing condition :  Patient has been advised of split billing requirements and indicates understanding: Yes  Healthy Habits:     Getting at least 3 servings of Calcium per day:  Yes    Bi-annual eye exam:  Yes    Dental care twice a year:  Yes    Sleep apnea or symptoms of sleep apnea:  None    Diet:  Low fat/cholesterol and Breakfast skipped    Frequency of exercise:  None    Taking medications regularly:  Yes    Medication side effects:  None    PHQ-2 Total Score: 0    Additional concerns today:  No      Recheck cholesterol,  Refill anxiety medication use episodically, 10 pills usually last 1 year.      Today's PHQ-2 Score:   PHQ-2 ( 1999 Pfizer) 11/2/2021   Q1: Little interest or pleasure in doing things 0   Q2: Feeling down, depressed or hopeless 0   PHQ-2 Score 0   Q1: Little interest or pleasure in doing things Not at all   Q2: Feeling down, depressed or hopeless Not at all   PHQ-2 Score 0       Abuse: Current or Past(Physical, Sexual or Emotional)- No  Do you feel safe in your environment? Yes        Social History     Tobacco Use     Smoking status: Current Every Day Smoker     Smokeless tobacco: Never Used   Substance Use Topics     Alcohol use: Not on file     If you drink alcohol do you typically have >3 drinks per day or >7 drinks per week? No    Alcohol Use 11/4/2021   Prescreen: >3 drinks/day or >7 drinks/week? -   Prescreen: >3 drinks/day or >7 drinks/week? No   AUDIT SCORE  -       Last PSA:   Prostate Specific Antigen Screen   Date Value Ref Range Status    10/29/2020 0.5 0.0 - 3.5 ng/mL Final       Reviewed orders with patient. Reviewed health maintenance and updated orders accordingly - Yes    Lab work is in process  Labs reviewed in EPIC  BP Readings from Last 3 Encounters:   11/04/21 (!) 144/89   10/29/20 (!) 140/87   12/09/19 125/85    Wt Readings from Last 3 Encounters:   11/04/21 74.8 kg (165 lb)   10/29/20 77.6 kg (171 lb)   12/09/19 77.1 kg (170 lb)                  Patient Active Problem List   Diagnosis     Vitamin D deficiency disease     Anxiety     Essential hypertension     Mixed hyperlipidemia     Diverticulosis of large intestine     History reviewed. No pertinent surgical history.    Social History     Tobacco Use     Smoking status: Current Every Day Smoker     Smokeless tobacco: Never Used   Substance Use Topics     Alcohol use: Not on file     Family History   Problem Relation Age of Onset     Arthritis Mother      Cancer Mother      Depression Mother      Early Death Father      Heart Disease Father      Hypertension Sister      Hypertension Brother          Current Outpatient Medications   Medication Sig Dispense Refill     ALPRAZolam (XANAX) 0.5 MG tablet [ALPRAZOLAM (XANAX) 0.5 MG TABLET] TAKE 1 TABLET(0.5 MG) BY MOUTH DAILY AS NEEDED FOR ANXIETY 10 tablet 1     atorvastatin (LIPITOR) 10 MG tablet Take 1 tablet (10 mg) by mouth daily 30 tablet 11     benazepril-hydrochlorothiazide (LOTENSIN HCT) 20-25 MG tablet Take 1 tablet by mouth daily 90 tablet 3     docosahexanoic acid/epa (FISH OIL ORAL) [DOCOSAHEXANOIC ACID/EPA (FISH OIL ORAL)] Take by mouth.       UNABLE TO FIND [UNABLE TO FIND] Med Name:flax seed/fiber       No Known Allergies  Recent Labs   Lab Test 10/29/20  1229 12/09/19  1145 10/11/18  1036 10/11/18  1036 12/18/17  1211   A1C  --   --  5.4  --  5.2   *  --   --  157* 127   HDL 48  --   --  42 38*   TRIG 149  --   --  264* 358*   ALT 52* 31  --  38  --    CR 0.82 0.84   < > 0.80  --    GFRESTIMATED >60 >60   < > >60  --   "  GFRESTBLACK >60 >60   < > >60  --    POTASSIUM 4.4 4.6   < > 4.1  --    TSH 1.55  --   --  2.42  --     < > = values in this interval not displayed.        Reviewed and updated as needed this visit by clinical staff  Tobacco  Allergies  Meds   Med Hx  Surg Hx  Fam Hx  Soc Hx        Reviewed and updated as needed this visit by Provider                History reviewed. No pertinent past medical history.   History reviewed. No pertinent surgical history.  OB History   No obstetric history on file.       Review of Systems  CONSTITUTIONAL: NEGATIVE for fever, chills, change in weight  INTEGUMENTARY/SKIN: NEGATIVE for worrisome rashes, moles or lesions  EYES: NEGATIVE for vision changes or irritation  ENT: NEGATIVE for ear, mouth and throat problems  RESP: NEGATIVE for significant cough or SOB  CV: NEGATIVE for chest pain, palpitations or peripheral edema  GI: NEGATIVE for nausea, abdominal pain, heartburn, or change in bowel habits   male: negative for dysuria, hematuria, decreased urinary stream, erectile dysfunction, urethral discharge  MUSCULOSKELETAL: NEGATIVE for significant arthralgias or myalgia  NEURO: NEGATIVE for weakness, dizziness or paresthesias  PSYCHIATRIC: NEGATIVE for changes in mood or affect    OBJECTIVE:   BP (!) 144/89 (BP Location: Left arm, Patient Position: Sitting, Cuff Size: Adult Regular)   Pulse 84   Temp 97.3  F (36.3  C) (Temporal)   Resp 16   Ht 1.76 m (5' 9.29\")   Wt 74.8 kg (165 lb)   SpO2 99%   BMI 24.16 kg/m      Physical Exam  GENERAL: healthy, alert and no distress  EYES: Eyes grossly normal to inspection, PERRL and conjunctivae and sclerae normal  HENT: ear canals and TM's normal, nose and mouth without ulcers or lesions  NECK: no adenopathy, no asymmetry, masses, or scars and thyroid normal to palpation  RESP: lungs clear to auscultation - no rales, rhonchi or wheezes  CV: regular rate and rhythm, normal S1 S2, no S3 or S4, no murmur, click or rub, no peripheral " "edema and peripheral pulses strong  ABDOMEN: soft, nontender, no hepatosplenomegaly, no masses and bowel sounds normal  MS: no gross musculoskeletal defects noted, no edema  SKIN: no suspicious lesions or rashes  NEURO: Normal strength and tone, mentation intact and speech normal  PSYCH: mentation appears normal, affect normal/bright    Diagnostic Test Results:  Labs reviewed in Epic    ASSESSMENT/PLAN:   (Z00.00) Visit for preventive health examination  (primary encounter diagnosis)  Comment:   Plan:     (E78.2) Mixed hyperlipidemia  Comment:   Plan: Lipid panel reflex to direct LDL Fasting            (I10) Essential hypertension  Comment:   Plan: **Comprehensive metabolic panel FUTURE 14d            (Z12.5) Screening for prostate cancer  Comment:   Plan: PSA, screen            (F41.9) Anxiety  Comment:   Plan: ALPRAZolam (XANAX) 0.5 MG tablet            (F17.200) Nicotine dependence, uncomplicated, unspecified nicotine product type  Comment:   Plan:     (F17.210) Cigarette nicotine dependence without complication  Comment:   Plan: SMOKING CESSATION COUNSELING 3-10 MIN            (Z87.891) Personal history of tobacco use  Comment:   Plan: Prof fee: Shared Decisionmaking for Lung Cancer        Screening, CT Chest Lung Cancer Scrn Low Dose         wo              Patient has been advised of split billing requirements and indicates understanding: Yes  COUNSELING:   Reviewed preventive health counseling, as reflected in patient instructions       Regular exercise       Healthy diet/nutrition       Vision screening       Hearing screening       Prostate cancer screening       Smoking cessation discussed, declined any treatment at this point.    Estimated body mass index is 24.16 kg/m  as calculated from the following:    Height as of this encounter: 1.76 m (5' 9.29\").    Weight as of this encounter: 74.8 kg (165 lb).         He reports that he has been smoking. He has never used smokeless tobacco.  Tobacco Cessation " Action Plan:   Information offered: Patient not interested at this time      Counseling Resources:  ATP IV Guidelines  Pooled Cohorts Equation Calculator  FRAX Risk Assessment  ICSI Preventive Guidelines  Dietary Guidelines for Americans, 2010  USDA's MyPlate  ASA Prophylaxis  Lung CA Screening    Tj Early MD  [facility]

## 2021-11-04 NOTE — PROGRESS NOTES
Lung Cancer Screening Shared Decision Making Visit     Matt Alfaro is eligible for lung cancer screening on the basis of the information provided in my signed lung cancer screening order.     I have discussed with patient the risks and benefits of screening for lung cancer with low-dose CT.     The risks include:  radiation exposure: one low dose chest CT has as much ionizing radiation as about 15 chest x-rays or 6 months of background radiation living in [place]    false positives: 96% of positive findings/nodules are NOT cancer, but some might still require additional diagnostic evaluation, including biopsy  over-diagnosis: some slow growing cancers that might never have been clinically significant will be detected and treated unnecessarily     The benefit of early detection of lung cancer is contingent upon adherence to annual screening or more frequent follow up if indicated.     Furthermore, reaping the benefits of screening requires Matt Alfaro to be willing and physically able to undergo diagnostic procedures, if indicated. Although no specific guide is available for determining severity of comorbidities, it is reasonable to withhold screening in patients who have greater mortality risk from other diseases.     We did discuss that the only way to prevent lung cancer is to not smoke. Smoking cessation counseling was given, duration 3-10 minutes.      I did not offer risk estimation using a calculator such as this one:    ShouldIScreen        Answers for HPI/ROS submitted by the patient on 11/2/2021  Frequency of exercise:: None  Getting at least 3 servings of Calcium per day:: Yes  Diet:: Low fat/cholesterol, Breakfast skipped  Taking medications regularly:: Yes  Medication side effects:: None  Bi-annual eye exam:: Yes  Dental care twice a year:: Yes  Sleep apnea or symptoms of sleep apnea:: None  Additional concerns today:: No

## 2022-04-11 ENCOUNTER — MYC MEDICAL ADVICE (OUTPATIENT)
Dept: FAMILY MEDICINE | Facility: CLINIC | Age: 57
End: 2022-04-11
Payer: COMMERCIAL

## 2022-04-11 DIAGNOSIS — I10 ESSENTIAL HYPERTENSION: ICD-10-CM

## 2022-04-12 RX ORDER — BENAZEPRIL/HYDROCHLOROTHIAZIDE 20 MG-25MG
1 TABLET ORAL DAILY
Qty: 90 TABLET | Refills: 3 | Status: SHIPPED | OUTPATIENT
Start: 2022-04-12 | End: 2022-04-26

## 2022-04-26 ENCOUNTER — MYC MEDICAL ADVICE (OUTPATIENT)
Dept: FAMILY MEDICINE | Facility: CLINIC | Age: 57
End: 2022-04-26
Payer: COMMERCIAL

## 2022-04-26 DIAGNOSIS — I10 ESSENTIAL HYPERTENSION: ICD-10-CM

## 2022-04-26 RX ORDER — BENAZEPRIL/HYDROCHLOROTHIAZIDE 20 MG-25MG
1 TABLET ORAL DAILY
Qty: 90 TABLET | Refills: 3 | Status: SHIPPED | OUTPATIENT
Start: 2022-04-26 | End: 2022-05-04

## 2022-05-04 RX ORDER — BENAZEPRIL/HYDROCHLOROTHIAZIDE 20 MG-25MG
1 TABLET ORAL DAILY
Qty: 90 TABLET | Refills: 3 | Status: SHIPPED | OUTPATIENT
Start: 2022-05-04 | End: 2023-02-15

## 2022-05-13 ENCOUNTER — MYC MEDICAL ADVICE (OUTPATIENT)
Dept: FAMILY MEDICINE | Facility: CLINIC | Age: 57
End: 2022-05-13
Payer: COMMERCIAL

## 2022-05-13 NOTE — LETTER
May 17, 2022      Matt Alfaro  [address]  [place] [address]      Dear MIKEL Gutierrez [facility] has tried to reach you by phone several times regarding your My Chart message. If needed or not better, please call [facility] at [phone] and ask to speak to a nurse. Thank you.      Sincerely,      [facility] RN

## 2022-05-13 NOTE — TELEPHONE ENCOUNTER
See My Chart Message below  RN attempted to contact patient, but no answer. Left message on patient's voice mail to call clinic back.    If patient calls back, please transfer call to [facility] Triage for severe abdominal pain.  Dr Early not in clinic today and no appointments available in clinic    Latonia Brannon RN  [facility]      My Chart message dated 5/13/2022  Good Morning Dr Early,  My diverticulitis has flared up and is pretty painful.  I'm on my third day.  Wondering what I should do.  Antibiotics?     Thanks, Matt

## 2022-05-16 NOTE — TELEPHONE ENCOUNTER
RN made 2nd attempt to call patient regarding his mychart message.       Patient did not answer. Left message to patient to call the clinic back.      If patient calls back, please transfer to redline or [place] Nurse Advisor regarding his abdominal pain.           Ly Khan RN  [facility]

## 2022-05-17 ENCOUNTER — NURSE TRIAGE (OUTPATIENT)
Dept: NURSING | Facility: CLINIC | Age: 57
End: 2022-05-17
Payer: COMMERCIAL

## 2022-05-17 ENCOUNTER — OFFICE VISIT (OUTPATIENT)
Dept: FAMILY MEDICINE | Facility: CLINIC | Age: 57
End: 2022-05-17
Payer: COMMERCIAL

## 2022-05-17 VITALS
DIASTOLIC BLOOD PRESSURE: 74 MMHG | HEART RATE: 85 BPM | BODY MASS INDEX: 23.12 KG/M2 | OXYGEN SATURATION: 99 % | WEIGHT: 157.9 LBS | SYSTOLIC BLOOD PRESSURE: 124 MMHG

## 2022-05-17 DIAGNOSIS — K57.32 DIVERTICULITIS OF COLON: Primary | ICD-10-CM

## 2022-05-17 DIAGNOSIS — D48.5 NEOPLASM OF UNCERTAIN BEHAVIOR OF SKIN: ICD-10-CM

## 2022-05-17 PROCEDURE — 99214 OFFICE O/P EST MOD 30 MIN: CPT | Performed by: FAMILY MEDICINE

## 2022-05-17 RX ORDER — CIPROFLOXACIN 250 MG/1
250 TABLET, FILM COATED ORAL 2 TIMES DAILY
Qty: 20 TABLET | Refills: 0 | Status: SHIPPED | OUTPATIENT
Start: 2022-05-17 | End: 2022-05-27

## 2022-05-17 RX ORDER — METRONIDAZOLE 250 MG/1
250 TABLET ORAL 3 TIMES DAILY
Qty: 30 TABLET | Refills: 0 | Status: SHIPPED | OUTPATIENT
Start: 2022-05-17

## 2022-05-17 NOTE — TELEPHONE ENCOUNTER
See My Chart Message below  RN made 3rd attempt to contact patient, but no answer. Left message on patient's voice mail to call clinic back.    Per protocol letter mailed to patient's home address    If patient calls back, please transfer call to [facility] Triage for severe abdominal pain.    Latonia Brannon RN  [facility]

## 2022-05-17 NOTE — PATIENT INSTRUCTIONS
Thanks for coming in Matt [place] to meet you    Doing very bland diet  Continue your fiber supplement    I would start ciprofloxacin 250 mg twice daily for 10 days  Metronidazole 250 mg 3 times a day for 10 days    Eat cultured foods such as yogurt, Kombucha, kefir, unfiltered apple cider vinegar to help keep your gut and valves    Follow-up with your Dr. Early if things are not resolving or worsening    I would like you to have Dr. Early consider doing a biopsy of the right ear    I could do this as well    Akash

## 2022-05-17 NOTE — PROGRESS NOTES
ASSESSMENT & PLAN    No problem-specific Assessment & Plan notes found for this encounter.      Matt was seen today for abdominal pain.    Diagnoses and all orders for this visit:    Diverticulitis of colon  -     metroNIDAZOLE (FLAGYL) 250 MG tablet; Take 1 tablet (250 mg) by mouth 3 times daily  -     ciprofloxacin (CIPRO) 250 MG tablet; Take 1 tablet (250 mg) by mouth 2 times daily for 10 days    Neoplasm of uncertain behavior of skin        There are no Patient Instructions on file for this visit.    No follow-ups on file.       PATIENT HEALTH QUESTIONNAIRE-9 (PHQ - 9)    Over the last 2 weeks, how often have you been bothered by any of the following problems?    1. Little interest or pleasure in doing things -      2. Feeling down, depressed, or hopeless -      3. Trouble falling or staying asleep, or sleeping too much -     4. Feeling tired or having little energy -      5. Poor appetite or overeating -      6. Feeling bad about yourself - or that you are a failure or have let yourself or your family down -      7. Trouble concentrating on things, such as reading the newspaper or watching television -     8. Moving or speaking so slowly that other people could have noticed? Or the opposite - being so fidgety or restless that you have been moving around a lot more than usual     9. Thoughts that you would be better off dead or of hurting  yourself in some way     Total Score:       If you checked off any problems, how difficult have these problems made it for you to do your work, take care of things at home, or get along with other people?      Developed by Radha Zelaya, Ale Oneil, Familia Jeronimo and colleagues, with an educational emilie from [employer] No permission required to reproduce, translate, display or distribute. permission required to reproduce, translate, display or distribute.    CHIEF COMPLAINT: Matt Alfaro had concerns including Abdominal Pain (Concerns for  diverticulitis - has had in the past. Intermittent for over a week. ).    [place]: 1.............. SUBJECTIVE:  Matt Alfaro is a 56 year old male had concerns including Abdominal Pain (Concerns for diverticulitis - has had in the past. Intermittent for over a week. ).    1. Diverticulitis of colon    2. Neoplasm of uncertain behavior of skin      Feeling unwell last week  History of diverticulitis  Left lower quadrant worse in the a.m.    Colonoscopy 2021 reviewed  No fevers no nausea little bit of loose stool no blood  Has had antibiotics in the past  Goes [facility] [address]    No known drug allergies  Sees Dr. Early      No Known Allergies                      SOCIAL: He  reports that he has been smoking. He has never used smokeless tobacco.    REVIEW OF SYSTEMS:   Family history not pertinent to chief complaint or presenting problem    Review of systems otherwise negative as requested from patient, except   Those positive ROS outlined and discussed in [place].      VITALS:  Vitals:    05/17/22 1347   BP: 124/74   BP Location: Left arm   Patient Position: Sitting   Cuff Size: Adult Regular   Pulse: 85   SpO2: 99%   Weight: 71.6 kg (157 lb 14.4 oz)     Wt Readings from Last 3 Encounters:   05/17/22 71.6 kg (157 lb 14.4 oz)   11/04/21 74.8 kg (165 lb)   10/29/20 77.6 kg (171 lb)     Body mass index is 23.12 kg/m .    Physical Exam:  TMs clear  Oropharynx is clear upper dentures  No cervical or supraclavicular nodes  Small dark mole right pinna 1 mm  Abdomen soft tender to palpation left greater than right lower quadrant  No rebound  He has presence of bowel sounds  No lower extremity edema         I spent 20  minutes with this patient.  This includes pre-visit, intra-visit and post visit work an evaluation with regards to Matt was seen today for abdominal pain.    Diagnoses and all orders for this visit:    Diverticulitis of colon  -     metroNIDAZOLE (FLAGYL) 250 MG tablet; Take 1 tablet (250 mg) by mouth 3 times  daily  -     ciprofloxacin (CIPRO) 250 MG tablet; Take 1 tablet (250 mg) by mouth 2 times daily for 10 days    Neoplasm of uncertain behavior of skin        Blu Tang MD  [facility] [address] [phone]

## 2022-05-17 NOTE — TELEPHONE ENCOUNTER
Triage call:     Patient calling with history of diverticulitis. He is experiencing intermittent abdominal pain for 5-6 days.   He mostly feels pain in lower abdomen on the left side.   At the time of the call he reports mild cramping that comes and goes.   When he is experiencing more intense pain, it hurts when he urinates.   Additionally he complains of joint pain in knees and fingers for the last week.     Per protocol, patient was advised to be seen in clinic today. Care advice given. If no appointments, advised patient be seen in walk in clinic. He verbalizes understanding and agreement with this plan.     Tracy Candelario RN   05/17/22 9:31 AM  [facility] Nurse Advisor  Reason for Disposition    MILD pain that comes and goes (cramps) lasts > 24 hours    Additional Information    Negative: Passed out (i.e., fainted, collapsed and was not responding)    Negative: Shock suspected (e.g., cold/pale/clammy skin, too weak to stand, low BP, rapid pulse)    Negative: Sounds like a life-threatening emergency to the triager    Negative: Chest pain    Negative: Pain is mainly in upper abdomen (if needed ask: 'is it mainly above the belly button?')    Negative: SEVERE abdominal pain (e.g., excruciating)    Negative: Vomiting red blood or black (coffee ground) material    Negative: Bloody, black, or tarry bowel movements (Exception: chronic-unchanged black-grey bowel movements and is taking iron pills or Pepto-bismol)    Negative: Unable to urinate (or only a few drops) and bladder feels very full    Negative: Pain in scrotum persists > 1 hour    Negative: Constant abdominal pain lasting > 2 hours    Negative: Vomiting bile (green color)    Negative: Patient sounds very sick or weak to the triager    Negative: Vomiting and abdomen looks much more swollen than usual    Negative: Blood in urine (red, pink, or tea-colored)    Negative: White of the eyes have turned yellow (i.e., jaundice)    Negative: Fever > 103 F (39.4  C)    Negative: Fever > 101 F (38.3 C) and over 60 years of age    Negative: Fever > 100.0 F (37.8 C) and has diabetes mellitus or a weak immune system (e.g., HIV positive, cancer chemotherapy, organ transplant, splenectomy, chronic steroids)    Negative: Fever > 100.0 F (37.8 C) and bedridden (e.g., nursing home patient, stroke, chronic illness, recovering from surgery)    Protocols used: ABDOMINAL PAIN - MALE-A-OH    COVID 19 Nurse Triage Plan/Patient Instructions    Please be aware that novel coronavirus (COVID-19) may be circulating in the community. If you develop symptoms such as fever, cough, or SOB or if you have concerns about the presence of another infection including coronavirus (COVID-19), please contact your health care provider or visit [email or web address].     Disposition/Instructions    In-Person Visit with provider recommended. Reference Visit Selection Guide.    Thank you for taking steps to prevent the spread of this virus.  o Limit your contact with others.  o Wear a simple mask to cover your cough.  o Wash your hands well and often.    Resources    [facility]: About COVID-19: [email or web address]    CDC: What to Do If You're Sick: www.cdc.gov/coronavirus/2019-ncov/about/steps-when-sick.html    CDC: Ending Home Isolation: www.cdc.gov/coronavirus/2019-ncov/hcp/disposition-in-home-patients.html     CDC: Caring for Someone: www.cdc.gov/coronavirus/2019-ncov/if-you-are-sick/care-for-someone.html     [facility]: Interim Guidance for Hospital Discharge to Home: www.health.[facility].[place]/diseases/coronavirus/hcp/hospdischarge.pdf    [facility] clinical trials (COVID-19 research studies): [email or web address]     Below are the COVID-19 hotlines at the [place] Department [facility] ([facility]). Interpreters are available.   o For health questions: Call 865-128-3242 or 1-358.976.4158 (7 a.m. to 7 p.m.)  o For questions about schools and childcare: Call  998.579.7289 or 1-492.480.9795 (7 a.m. to 7 p.m.)

## 2022-07-26 ENCOUNTER — MYC MEDICAL ADVICE (OUTPATIENT)
Dept: FAMILY MEDICINE | Facility: CLINIC | Age: 57
End: 2022-07-26

## 2022-07-26 DIAGNOSIS — E78.5 HYPERLIPIDEMIA LDL GOAL <130: ICD-10-CM

## 2022-07-26 DIAGNOSIS — F41.9 ANXIETY: ICD-10-CM

## 2022-07-27 RX ORDER — ALPRAZOLAM 0.5 MG
TABLET ORAL
Qty: 10 TABLET | Refills: 1 | Status: SHIPPED | OUTPATIENT
Start: 2022-07-27 | End: 2023-02-15

## 2022-07-27 RX ORDER — ATORVASTATIN CALCIUM 10 MG/1
10 TABLET, FILM COATED ORAL DAILY
Qty: 30 TABLET | Refills: 11 | Status: SHIPPED | OUTPATIENT
Start: 2022-07-27 | End: 2022-08-08

## 2022-08-05 DIAGNOSIS — E78.5 HYPERLIPIDEMIA LDL GOAL <130: ICD-10-CM

## 2022-08-05 RX ORDER — ATORVASTATIN CALCIUM 10 MG/1
10 TABLET, FILM COATED ORAL DAILY
Qty: 30 TABLET | Refills: 11 | OUTPATIENT
Start: 2022-08-05

## 2022-08-08 DIAGNOSIS — E78.5 HYPERLIPIDEMIA LDL GOAL <130: ICD-10-CM

## 2022-08-08 RX ORDER — ATORVASTATIN CALCIUM 10 MG/1
10 TABLET, FILM COATED ORAL DAILY
Qty: 30 TABLET | Refills: 11 | Status: SHIPPED | OUTPATIENT
Start: 2022-08-08 | End: 2023-05-19

## 2022-08-08 NOTE — TELEPHONE ENCOUNTER
Pending Prescriptions:                       Disp   Refills    atorvastatin (LIPITOR) 10 MG tablet       30 tab*11           Sig: Take 1 tablet (10 mg) by mouth daily    Pharmacy requesting dose and patient signature verification.

## 2022-08-08 NOTE — TELEPHONE ENCOUNTER
Pharmacy called back and want to know the quantity and directions. Please send correct refill to the pharmacy. Thank you!

## 2022-09-25 ENCOUNTER — HEALTH MAINTENANCE LETTER (OUTPATIENT)
Age: 57
End: 2022-09-25

## 2023-01-30 ENCOUNTER — HEALTH MAINTENANCE LETTER (OUTPATIENT)
Age: 58
End: 2023-01-30

## 2023-02-15 ENCOUNTER — OFFICE VISIT (OUTPATIENT)
Dept: FAMILY MEDICINE | Facility: CLINIC | Age: 58
End: 2023-02-15
Payer: COMMERCIAL

## 2023-02-15 VITALS
OXYGEN SATURATION: 98 % | TEMPERATURE: 97.9 F | RESPIRATION RATE: 16 BRPM | WEIGHT: 153 LBS | BODY MASS INDEX: 22.66 KG/M2 | SYSTOLIC BLOOD PRESSURE: 124 MMHG | DIASTOLIC BLOOD PRESSURE: 76 MMHG | HEART RATE: 76 BPM | HEIGHT: 69 IN

## 2023-02-15 DIAGNOSIS — Z00.00 VISIT FOR PREVENTIVE HEALTH EXAMINATION: Primary | ICD-10-CM

## 2023-02-15 DIAGNOSIS — I10 ESSENTIAL HYPERTENSION: ICD-10-CM

## 2023-02-15 DIAGNOSIS — Z13.29 SCREENING FOR ENDOCRINE, NUTRITIONAL, METABOLIC AND IMMUNITY DISORDER: ICD-10-CM

## 2023-02-15 DIAGNOSIS — Z13.0 SCREENING FOR ENDOCRINE, NUTRITIONAL, METABOLIC AND IMMUNITY DISORDER: ICD-10-CM

## 2023-02-15 DIAGNOSIS — F41.9 ANXIETY: ICD-10-CM

## 2023-02-15 DIAGNOSIS — Z13.228 SCREENING FOR ENDOCRINE, NUTRITIONAL, METABOLIC AND IMMUNITY DISORDER: ICD-10-CM

## 2023-02-15 DIAGNOSIS — Z13.21 SCREENING FOR ENDOCRINE, NUTRITIONAL, METABOLIC AND IMMUNITY DISORDER: ICD-10-CM

## 2023-02-15 DIAGNOSIS — E78.2 MIXED HYPERLIPIDEMIA: ICD-10-CM

## 2023-02-15 DIAGNOSIS — Z12.5 SCREENING FOR PROSTATE CANCER: ICD-10-CM

## 2023-02-15 DIAGNOSIS — K57.30 DIVERTICULOSIS OF LARGE INTESTINE WITHOUT HEMORRHAGE: ICD-10-CM

## 2023-02-15 DIAGNOSIS — E55.9 VITAMIN D DEFICIENCY DISEASE: ICD-10-CM

## 2023-02-15 LAB
ALBUMIN SERPL BCG-MCNC: 4.8 G/DL (ref 3.5–5.2)
ALP SERPL-CCNC: 93 U/L (ref 40–129)
ALT SERPL W P-5'-P-CCNC: 30 U/L (ref 10–50)
ANION GAP SERPL CALCULATED.3IONS-SCNC: 13 MMOL/L (ref 7–15)
AST SERPL W P-5'-P-CCNC: 36 U/L (ref 10–50)
BILIRUB SERPL-MCNC: 0.6 MG/DL
BUN SERPL-MCNC: 4.4 MG/DL (ref 6–20)
CALCIUM SERPL-MCNC: 10.2 MG/DL (ref 8.6–10)
CHLORIDE SERPL-SCNC: 97 MMOL/L (ref 98–107)
CHOLEST SERPL-MCNC: 171 MG/DL
CREAT SERPL-MCNC: 0.7 MG/DL (ref 0.67–1.17)
DEPRECATED HCO3 PLAS-SCNC: 27 MMOL/L (ref 22–29)
GFR SERPL CREATININE-BSD FRML MDRD: >90 ML/MIN/1.73M2
GLUCOSE SERPL-MCNC: 102 MG/DL (ref 70–99)
HBA1C MFR BLD: 5.1 % (ref 0–5.6)
HDLC SERPL-MCNC: 57 MG/DL
LDLC SERPL CALC-MCNC: 83 MG/DL
NONHDLC SERPL-MCNC: 114 MG/DL
POTASSIUM SERPL-SCNC: 4 MMOL/L (ref 3.4–5.3)
PROT SERPL-MCNC: 7.6 G/DL (ref 6.4–8.3)
PSA SERPL-MCNC: 0.67 NG/ML (ref 0–3.5)
SODIUM SERPL-SCNC: 137 MMOL/L (ref 136–145)
TRIGL SERPL-MCNC: 157 MG/DL
TSH SERPL DL<=0.005 MIU/L-ACNC: 2.09 UIU/ML (ref 0.3–4.2)

## 2023-02-15 PROCEDURE — 83036 HEMOGLOBIN GLYCOSYLATED A1C: CPT | Performed by: FAMILY MEDICINE

## 2023-02-15 PROCEDURE — 84153 ASSAY OF PSA TOTAL: CPT | Performed by: FAMILY MEDICINE

## 2023-02-15 PROCEDURE — 99396 PREV VISIT EST AGE 40-64: CPT | Performed by: FAMILY MEDICINE

## 2023-02-15 PROCEDURE — 80061 LIPID PANEL: CPT | Performed by: FAMILY MEDICINE

## 2023-02-15 PROCEDURE — 82306 VITAMIN D 25 HYDROXY: CPT | Performed by: FAMILY MEDICINE

## 2023-02-15 PROCEDURE — 84443 ASSAY THYROID STIM HORMONE: CPT | Performed by: FAMILY MEDICINE

## 2023-02-15 PROCEDURE — 36415 COLL VENOUS BLD VENIPUNCTURE: CPT | Performed by: FAMILY MEDICINE

## 2023-02-15 PROCEDURE — 80053 COMPREHEN METABOLIC PANEL: CPT | Performed by: FAMILY MEDICINE

## 2023-02-15 RX ORDER — ALPRAZOLAM 0.5 MG
TABLET ORAL
Qty: 10 TABLET | Refills: 1 | Status: SHIPPED | OUTPATIENT
Start: 2023-02-15 | End: 2024-02-08

## 2023-02-15 RX ORDER — BENAZEPRIL/HYDROCHLOROTHIAZIDE 20 MG-25MG
1 TABLET ORAL DAILY
Qty: 90 TABLET | Refills: 3 | Status: SHIPPED | OUTPATIENT
Start: 2023-02-15 | End: 2023-05-18

## 2023-02-15 ASSESSMENT — ENCOUNTER SYMPTOMS
HEMATURIA: 0
WEAKNESS: 0
DIARRHEA: 0
EYE PAIN: 0
SHORTNESS OF BREATH: 0
ARTHRALGIAS: 0
MYALGIAS: 0
PALPITATIONS: 0
PARESTHESIAS: 0
COUGH: 0
CHILLS: 0
HEMATOCHEZIA: 0
NAUSEA: 0
FEVER: 0
ABDOMINAL PAIN: 0
JOINT SWELLING: 0
DIZZINESS: 0
SORE THROAT: 0
DYSURIA: 0
NERVOUS/ANXIOUS: 0
CONSTIPATION: 0
HEARTBURN: 0
HEADACHES: 0
FREQUENCY: 0

## 2023-02-15 NOTE — PROGRESS NOTES
SUBJECTIVE:   CC: nithya is an 57 year old who presents for preventative health visit.     Patient has been advised of split billing requirements and indicates understanding: Yes  Healthy Habits:     Getting at least 3 servings of Calcium per day:  Yes    Bi-annual eye exam:  NO    Dental care twice a year:  Yes    Sleep apnea or symptoms of sleep apnea:  None    Diet:  Regular (no restrictions) and Other    Frequency of exercise:  1 day/week    Duration of exercise:  Less than 15 minutes    Taking medications regularly:  Yes    Medication side effects:  None    PHQ-2 Total Score: 0    Additional concerns today:  No    PROBLEMS TO ADD ON...  Overall doing fine, hypertension controlled with Lotensin 20-25 mg daily, tolerating well, hyperlipidemia controlled with atorvastatin 10 mg daily, mild anxiety controlled with as needed alprazolam.  Bout of Ac. diverticulitis last year but nothing recently.  Today's PHQ-2 Score:   PHQ-2 ( 1999 [employer]) 2/15/2023   Q1: Little interest or pleasure in doing things 0   Q2: Feeling down, depressed or hopeless 0   PHQ-2 Score 0   PHQ-2 Total Score (12-17 Years)- Positive if 3 or more points; Administer PHQ-A if positive -   Q1: Little interest or pleasure in doing things Several days   Q2: Feeling down, depressed or hopeless Several days   PHQ-2 Score 2           Social History     Tobacco Use     Smoking status: Every Day     Types: Cigarettes     Smokeless tobacco: Never   Substance Use Topics     Alcohol use: Not on file         Alcohol Use 2/15/2023   Prescreen: >3 drinks/day or >7 drinks/week? Yes   Prescreen: >3 drinks/day or >7 drinks/week? -   AUDIT SCORE  8       Last PSA:   Prostate Specific Antigen Screen   Date Value Ref Range Status   11/04/2021 0.57 0.00 - 3.50 ug/L Final       Reviewed orders with patient. 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    Social History     Tobacco Use     Smoking status: Every Day     Types: Cigarettes     Smokeless tobacco: Never   Substance Use Topics     Alcohol use: Not on file     Family History   Problem Relation Age of Onset     Arthritis Mother      Cancer Mother      Depression Mother      Early Death Father      Heart Disease Father      Hypertension Sister      Hypertension Brother          Current Outpatient Medications   Medication Sig Dispense Refill     ALPRAZolam (XANAX) 0.5 MG tablet [ALPRAZOLAM (XANAX) 0.5 MG TABLET] TAKE 1 TABLET(0.5 MG) BY MOUTH DAILY AS NEEDED FOR ANXIETY 10 tablet 1     atorvastatin (LIPITOR) 10 MG tablet Take 1 tablet (10 mg) by mouth daily 30 tablet 11     benazepril-hydrochlorothiazide (LOTENSIN HCT) 20-25 MG tablet Take 1 tablet by mouth daily 90 tablet 3     metroNIDAZOLE (FLAGYL) 250 MG tablet Take 1 tablet (250 mg) by mouth 3 times daily (Patient not taking: Reported on 2/15/2023) 30 tablet 0     UNABLE TO FIND [UNABLE TO FIND] Med Name:flax seed/fiber (Patient not taking: Reported on 2/15/2023)       No Known Allergies  Recent Labs   Lab Test 02/15/23  1125 11/04/21  1149 10/29/20  1229 12/09/19  1145 10/11/18  1036 12/18/17  1211   A1C 5.1  --   --   --  5.4 5.2   LDL 83 72 176*  --    < > 127   HDL 57 55 48  --    < > 38*   TRIG 157* 178* 149  --    < > 358*   ALT 30 27 52* 31   < >  --    CR 0.70 0.84 0.82 0.84   < >  --    GFRESTIMATED >90 >90 >60 >60   < >  --    GFRESTBLACK  --   --  >60 >60   < >  --    POTASSIUM  "4.0 4.3 4.4 4.6   < >  --    TSH 2.09  --  1.55  --    < >  --     < > = values in this interval not displayed.        Reviewed and updated as needed this visit by clinical staff   Tobacco  Allergies  Meds              Reviewed and updated as needed this visit by Provider                   No past medical history on file.   No past surgical history on file.  OB History   No obstetric history on file.       Review of Systems   Constitutional: Negative for chills and fever.   HENT: Negative for congestion, ear pain, hearing loss and sore throat.    Eyes: Negative for pain and visual disturbance.   Respiratory: Negative for cough and shortness of breath.    Cardiovascular: Negative for chest pain, palpitations and peripheral edema.   Gastrointestinal: Negative for abdominal pain, constipation, diarrhea, heartburn, hematochezia and nausea.   Genitourinary: Negative for dysuria, frequency, genital sores, hematuria, impotence, penile discharge and urgency.   Musculoskeletal: Negative for arthralgias, joint swelling and myalgias.   Skin: Negative for rash.   Neurological: Negative for dizziness, weakness, headaches and paresthesias.   Psychiatric/Behavioral: Negative for mood changes. The patient is not nervous/anxious.          OBJECTIVE:   /76 (BP Location: Right arm, Patient Position: Sitting, Cuff Size: Adult Regular)   Pulse 76   Temp 97.9  F (36.6  C) (Temporal)   Resp 16   Ht 1.76 m (5' 9.29\")   Wt 69.4 kg (153 lb)   BMI 22.40 kg/m      Physical Exam  GENERAL: healthy, alert and no distress  EYES: Eyes grossly normal to inspection, PERRL and conjunctivae and sclerae normal  HENT: ear canals and TM's normal, nose and mouth without ulcers or lesions  NECK: no adenopathy, no asymmetry, masses, or scars and thyroid normal to palpation  RESP: lungs clear to auscultation - no rales, rhonchi or wheezes  CV: regular rate and rhythm, normal S1 S2, no S3 or S4, no murmur, click or rub, no peripheral edema and " peripheral pulses strong  ABDOMEN: soft, nontender, no hepatosplenomegaly, no masses and bowel sounds normal  MS: no gross musculoskeletal defects noted, no edema  SKIN: no suspicious lesions or rashes  NEURO: Normal strength and tone, mentation intact and speech normal  PSYCH: mentation appears normal, affect normal/bright    Diagnostic Test Results:  Labs reviewed in Epic    ASSESSMENT/PLAN:   (Z00.00) Visit for preventive health examination  (primary encounter diagnosis)  Comment:   Plan:     (I10) Essential hypertension  Comment:   Plan: benazepril-hydrochlorothiazide (LOTENSIN HCT)         20-25 MG tablet            (E78.2) Mixed hyperlipidemia  Comment:   Plan: Lipid panel reflex to direct LDL Fasting,         **Comprehensive metabolic panel FUTURE 2mo            (K57.30) Diverticulosis of large intestine without hemorrhage  Comment:   Plan:     (E55.9) Vitamin D deficiency disease  Comment:   Plan: **Vitamin D Deficiency FUTURE 2mo            (Z13.29,  Z13.21,  Z13.228,  Z13.0) Screening for endocrine, nutritional, metabolic and immunity disorder  Comment:   Plan: Hemoglobin A1c FUTURE 3mo, **TSH with free T4         reflex FUTURE 2mo            (Z12.5) Screening for prostate cancer  Comment:   Plan: PSA, tumor marker            (F41.9) Anxiety  Comment:   Plan: ALPRAZolam (XANAX) 0.5 MG tablet          Hypertension stable, continue current management, continue healthy lifestyle changes.  Anxiety, as needed alprazolam discussed, possible side effect reviewed.  History of diverticulitis.  No recent exacerbation, discussed healthy life changes, or soft diet etc.  Continue to follow with dental office for new dentures.    Patient has been advised of split billing requirements and indicates understanding: Yes      COUNSELING:   Reviewed preventive health counseling, as reflected in patient instructions       Regular exercise       Healthy diet/nutrition       Vision screening       Hearing screening        He  reports that he has been smoking cigarettes. He has never used smokeless tobacco.  Nicotine/Tobacco Cessation Plan:   Self help information given to patient            Tj Early MD  [facility]

## 2023-02-16 LAB — DEPRECATED CALCIDIOL+CALCIFEROL SERPL-MC: 53 UG/L (ref 20–75)

## 2023-05-17 DIAGNOSIS — I10 ESSENTIAL HYPERTENSION: ICD-10-CM

## 2023-05-17 DIAGNOSIS — E78.5 HYPERLIPIDEMIA LDL GOAL <130: ICD-10-CM

## 2023-05-18 RX ORDER — BENAZEPRIL/HYDROCHLOROTHIAZIDE 20 MG-25MG
1 TABLET ORAL DAILY
Qty: 90 TABLET | Refills: 3 | OUTPATIENT
Start: 2023-05-18

## 2023-05-18 RX ORDER — BENAZEPRIL/HYDROCHLOROTHIAZIDE 20 MG-25MG
1 TABLET ORAL DAILY
Qty: 90 TABLET | Refills: 2 | Status: SHIPPED | OUTPATIENT
Start: 2023-05-18 | End: 2024-02-07

## 2023-05-18 NOTE — TELEPHONE ENCOUNTER
"Routing refill request to provider for review/approval because:  Unable to refuse, has refills    Last Written Prescription Date:  8/8/2022  Last Fill Quantity: 30,  # refills: 11   Last office visit provider:  2/15/2023     Requested Prescriptions   Pending Prescriptions Disp Refills     atorvastatin (LIPITOR) 10 MG tablet [Pharmacy Med Name: ATORVASTATIN 10MG TABLETS] 30 tablet 11     Sig: TAKE 1 TABLET(10 MG) BY MOUTH DAILY       Statins Protocol Passed - 5/18/2023  1:59 PM        Passed - LDL on file in past 12 months     Recent Labs   Lab Test 02/15/23  1125   LDL 83             Passed - No abnormal creatine kinase in past 12 months     No lab results found.             Passed - Recent (12 mo) or future (30 days) visit within the authorizing provider's specialty     Patient has had an office visit with the authorizing provider or a provider within the authorizing providers department within the previous 12 mos or has a future within next 30 days. See \"Patient Info\" tab in inbasket, or \"Choose Columns\" in Meds & Orders section of the refill encounter.              Passed - Medication is active on med list        Passed - Patient is age 18 or older         Refused Prescriptions Disp Refills     benazepril-hydrochlorothiazide (LOTENSIN HCT) 20-25 MG tablet [Pharmacy Med Name: BENAZEPRIL/HCTZ 20/25MG TABLETS] 90 tablet 3     Sig: TAKE 1 TABLET BY MOUTH DAILY       Diuretics (Including Combos) Protocol Passed - 5/18/2023  1:59 PM        Passed - Blood pressure under 140/90 in past 12 months     BP Readings from Last 3 Encounters:   02/15/23 124/76   05/17/22 124/74   11/04/21 (!) 144/89                 Passed - Recent (12 mo) or future (30 days) visit within the authorizing provider's specialty     Patient has had an office visit with the authorizing provider or a provider within the authorizing providers department within the previous 12 mos or has a future within next 30 days. See \"Patient Info\" tab in inbasket, " "or \"Choose Columns\" in Meds & Orders section of the refill encounter.              Passed - Medication is active on med list        Passed - Patient is age 18 or older        Passed - Normal serum creatinine on file in past 12 months     Recent Labs   Lab Test 02/15/23  1125   CR 0.70              Passed - Normal serum potassium on file in past 12 months     Recent Labs   Lab Test 02/15/23  1125   POTASSIUM 4.0                    Passed - Normal serum sodium on file in past 12 months     Recent Labs   Lab Test 02/15/23  1125                ACE Inhibitors (Including Combos) Protocol Passed - 5/18/2023  1:59 PM        Passed - Blood pressure under 140/90 in past 12 months     BP Readings from Last 3 Encounters:   02/15/23 124/76   05/17/22 124/74   11/04/21 (!) 144/89                 Passed - Recent (12 mo) or future (30 days) visit within the authorizing provider's specialty     Patient has had an office visit with the authorizing provider or a provider within the authorizing providers department within the previous 12 mos or has a future within next 30 days. See \"Patient Info\" tab in inbasket, or \"Choose Columns\" in Meds & Orders section of the refill encounter.              Passed - Medication is active on med list        Passed - Patient is age 18 or older        Passed - Normal serum creatinine on file in past 12 months     Recent Labs   Lab Test 02/15/23  1125   CR 0.70       Ok to refill medication if creatinine is low          Passed - Normal serum potassium on file in past 12 months     Recent Labs   Lab Test 02/15/23  1125   POTASSIUM 4.0                  Bella Whitney RN 05/18/23 2:01 PM  "

## 2023-05-19 RX ORDER — ATORVASTATIN CALCIUM 10 MG/1
TABLET, FILM COATED ORAL
Qty: 30 TABLET | Refills: 11 | Status: SHIPPED | OUTPATIENT
Start: 2023-05-19 | End: 2023-08-16

## 2023-08-10 DIAGNOSIS — E78.5 HYPERLIPIDEMIA LDL GOAL <130: ICD-10-CM

## 2023-08-10 RX ORDER — ATORVASTATIN CALCIUM 10 MG/1
TABLET, FILM COATED ORAL
Qty: 30 TABLET | Refills: 11 | OUTPATIENT
Start: 2023-08-10

## 2023-08-14 ENCOUNTER — MYC MEDICAL ADVICE (OUTPATIENT)
Dept: FAMILY MEDICINE | Facility: CLINIC | Age: 58
End: 2023-08-14
Payer: COMMERCIAL

## 2023-08-14 DIAGNOSIS — I10 ESSENTIAL HYPERTENSION: ICD-10-CM

## 2023-08-14 DIAGNOSIS — E78.5 HYPERLIPIDEMIA LDL GOAL <130: ICD-10-CM

## 2023-08-15 RX ORDER — BENAZEPRIL/HYDROCHLOROTHIAZIDE 20 MG-25MG
1 TABLET ORAL DAILY
Qty: 90 TABLET | Refills: 2 | OUTPATIENT
Start: 2023-08-15

## 2023-08-15 RX ORDER — ATORVASTATIN CALCIUM 10 MG/1
10 TABLET, FILM COATED ORAL DAILY
Qty: 30 TABLET | Refills: 11 | OUTPATIENT
Start: 2023-08-15

## 2023-08-15 NOTE — TELEPHONE ENCOUNTER
Should have refills on file    Last Written Prescription Date:  5/19/23  Last Fill Quantity: 30,  # refills: 11   Last office visit provider:  2/15/23     Last Written Prescription Date:  5/18/23  Last Fill Quantity: 90,  # refills: 2   Last office visit provider:  2/15/23       Ira Lou RN 08/15/23 12:59 AM

## 2023-08-16 DIAGNOSIS — E78.5 HYPERLIPIDEMIA LDL GOAL <130: ICD-10-CM

## 2023-08-16 RX ORDER — ATORVASTATIN CALCIUM 10 MG/1
TABLET, FILM COATED ORAL
Qty: 30 TABLET | Refills: 11 | Status: SHIPPED | OUTPATIENT
Start: 2023-08-16 | End: 2024-02-08

## 2023-08-16 NOTE — TELEPHONE ENCOUNTER
Message routed to Dr Early as patient using different pharmacy.    Latonia Brannon RN  [facility]

## 2024-02-06 DIAGNOSIS — I10 ESSENTIAL HYPERTENSION: ICD-10-CM

## 2024-02-07 DIAGNOSIS — F41.9 ANXIETY: ICD-10-CM

## 2024-02-07 DIAGNOSIS — E78.5 HYPERLIPIDEMIA LDL GOAL <130: ICD-10-CM

## 2024-02-07 DIAGNOSIS — I10 ESSENTIAL HYPERTENSION: ICD-10-CM

## 2024-02-07 RX ORDER — BENAZEPRIL/HYDROCHLOROTHIAZIDE 20 MG-25MG
1 TABLET ORAL DAILY
Qty: 90 TABLET | Refills: 0 | Status: SHIPPED | OUTPATIENT
Start: 2024-02-07 | End: 2024-02-08

## 2024-02-07 NOTE — TELEPHONE ENCOUNTER
General Call      Reason for Call:  FYI    What are your questions or concerns:  Pt called to switch pharmacy to [facility] due to insurance changing their pharmacy. Please send pt all RX to [facility] mail and order, [id number] [place] [address]. Pt has one week left of all RX. Thanks!    Date of last appointment with provider: 2/15/2023

## 2024-02-08 RX ORDER — BENAZEPRIL/HYDROCHLOROTHIAZIDE 20 MG-25MG
1 TABLET ORAL DAILY
Qty: 90 TABLET | Refills: 0 | Status: CANCELLED | OUTPATIENT
Start: 2024-02-08

## 2024-02-08 RX ORDER — ATORVASTATIN CALCIUM 10 MG/1
10 TABLET, FILM COATED ORAL DAILY
Qty: 30 TABLET | Refills: 11 | Status: CANCELLED | OUTPATIENT
Start: 2024-02-08

## 2024-02-08 RX ORDER — ATORVASTATIN CALCIUM 10 MG/1
10 TABLET, FILM COATED ORAL DAILY
Qty: 30 TABLET | Refills: 6 | Status: SHIPPED | OUTPATIENT
Start: 2024-02-08 | End: 2024-07-22

## 2024-02-08 RX ORDER — BENAZEPRIL/HYDROCHLOROTHIAZIDE 20 MG-25MG
1 TABLET ORAL DAILY
Qty: 90 TABLET | Refills: 0 | Status: SHIPPED | OUTPATIENT
Start: 2024-02-08 | End: 2024-05-14

## 2024-02-08 NOTE — TELEPHONE ENCOUNTER
ALPRAZolam (XANAX) 0.5 MG tablet   atorvastatin (LIPITOR) 10 MG tablet   benazepril-hydrochlorothiazide (LOTENSIN HCT) 20-25 MG tablet

## 2024-02-12 RX ORDER — ALPRAZOLAM 0.5 MG
TABLET ORAL
Qty: 10 TABLET | Refills: 1 | Status: SHIPPED | OUTPATIENT
Start: 2024-02-12

## 2024-02-26 DIAGNOSIS — I10 ESSENTIAL HYPERTENSION: ICD-10-CM

## 2024-02-26 NOTE — TELEPHONE ENCOUNTER
Medication Question or Refill    Contacts         Type Contact Phone/Fax    02/26/2024 04:14 PM CST Phone (Incoming) Alfaro, nithya MIKEL (Self) [phone] (M)     Ok to leave a detailed voicemail            What medication are you calling about (include dose and sig)?: benazepril-hydrochlorothiazide (LOTENSIN HCT) 20-25 MG tablet     Preferred Pharmacy:       [id number] - [place], [place] - [address]  [address]  [address]  [place] [address]  Phone: [phone] Fax: [phone]      Controlled Substance Agreement on file:   CSA -- Patient Level:    CSA: None found at the patient level.       Who prescribed the medication?:     Do you need a refill? Yes    When did you use the medication last? 02/26/24    Patient offered an appointment? No    Do you have any questions or concerns?  No      Could we send this information to you in PocketGuide or would you prefer to receive a phone call?:   Patient would like to be contacted via PocketGuide

## 2024-02-27 RX ORDER — BENAZEPRIL/HYDROCHLOROTHIAZIDE 20 MG-25MG
1 TABLET ORAL DAILY
Qty: 90 TABLET | Refills: 0 | OUTPATIENT
Start: 2024-02-27

## 2024-05-11 ENCOUNTER — HEALTH MAINTENANCE LETTER (OUTPATIENT)
Age: 59
End: 2024-05-11

## 2024-05-13 SDOH — HEALTH STABILITY: PHYSICAL HEALTH: ON AVERAGE, HOW MANY DAYS PER WEEK DO YOU ENGAGE IN MODERATE TO STRENUOUS EXERCISE (LIKE A BRISK WALK)?: 2 DAYS

## 2024-05-13 SDOH — HEALTH STABILITY: PHYSICAL HEALTH: ON AVERAGE, HOW MANY MINUTES DO YOU ENGAGE IN EXERCISE AT THIS LEVEL?: 20 MIN

## 2024-05-13 ASSESSMENT — SOCIAL DETERMINANTS OF HEALTH (SDOH): HOW OFTEN DO YOU GET TOGETHER WITH FRIENDS OR RELATIVES?: TWICE A WEEK

## 2024-05-14 ENCOUNTER — OFFICE VISIT (OUTPATIENT)
Dept: FAMILY MEDICINE | Facility: CLINIC | Age: 59
End: 2024-05-14
Payer: COMMERCIAL

## 2024-05-14 VITALS
TEMPERATURE: 98.2 F | OXYGEN SATURATION: 98 % | BODY MASS INDEX: 24.14 KG/M2 | DIASTOLIC BLOOD PRESSURE: 82 MMHG | HEIGHT: 69 IN | SYSTOLIC BLOOD PRESSURE: 137 MMHG | WEIGHT: 163 LBS | HEART RATE: 71 BPM | RESPIRATION RATE: 18 BRPM

## 2024-05-14 DIAGNOSIS — Z12.11 SCREEN FOR COLON CANCER: ICD-10-CM

## 2024-05-14 DIAGNOSIS — I10 ESSENTIAL HYPERTENSION: ICD-10-CM

## 2024-05-14 DIAGNOSIS — Z72.0 NICOTINE USE: ICD-10-CM

## 2024-05-14 DIAGNOSIS — Z00.00 ROUTINE GENERAL MEDICAL EXAMINATION AT A HEALTH CARE FACILITY: Primary | ICD-10-CM

## 2024-05-14 DIAGNOSIS — E78.2 MIXED HYPERLIPIDEMIA: ICD-10-CM

## 2024-05-14 LAB
ALBUMIN SERPL BCG-MCNC: 4.9 G/DL (ref 3.5–5.2)
ALP SERPL-CCNC: 91 U/L (ref 40–150)
ALT SERPL W P-5'-P-CCNC: 47 U/L (ref 0–70)
ANION GAP SERPL CALCULATED.3IONS-SCNC: 13 MMOL/L (ref 7–15)
AST SERPL W P-5'-P-CCNC: 53 U/L (ref 0–45)
BILIRUB SERPL-MCNC: 0.8 MG/DL
BUN SERPL-MCNC: 5.5 MG/DL (ref 8–23)
CALCIUM SERPL-MCNC: 9.6 MG/DL (ref 8.6–10)
CHLORIDE SERPL-SCNC: 97 MMOL/L (ref 98–107)
CHOLEST SERPL-MCNC: 152 MG/DL
CREAT SERPL-MCNC: 0.75 MG/DL (ref 0.67–1.17)
DEPRECATED HCO3 PLAS-SCNC: 26 MMOL/L (ref 22–29)
EGFRCR SERPLBLD CKD-EPI 2021: >90 ML/MIN/1.73M2
FASTING STATUS PATIENT QL REPORTED: YES
FASTING STATUS PATIENT QL REPORTED: YES
GLUCOSE SERPL-MCNC: 111 MG/DL (ref 70–99)
HBA1C MFR BLD: 5.4 % (ref 0–5.6)
HDLC SERPL-MCNC: 50 MG/DL
LDLC SERPL CALC-MCNC: 74 MG/DL
NONHDLC SERPL-MCNC: 102 MG/DL
POTASSIUM SERPL-SCNC: 3.7 MMOL/L (ref 3.4–5.3)
PROT SERPL-MCNC: 7.4 G/DL (ref 6.4–8.3)
SODIUM SERPL-SCNC: 136 MMOL/L (ref 135–145)
TRIGL SERPL-MCNC: 139 MG/DL

## 2024-05-14 PROCEDURE — 80061 LIPID PANEL: CPT | Performed by: FAMILY MEDICINE

## 2024-05-14 PROCEDURE — 99213 OFFICE O/P EST LOW 20 MIN: CPT | Mod: 25 | Performed by: FAMILY MEDICINE

## 2024-05-14 PROCEDURE — 90715 TDAP VACCINE 7 YRS/> IM: CPT | Performed by: FAMILY MEDICINE

## 2024-05-14 PROCEDURE — 99396 PREV VISIT EST AGE 40-64: CPT | Mod: 25 | Performed by: FAMILY MEDICINE

## 2024-05-14 PROCEDURE — 90471 IMMUNIZATION ADMIN: CPT | Performed by: FAMILY MEDICINE

## 2024-05-14 PROCEDURE — 99406 BEHAV CHNG SMOKING 3-10 MIN: CPT | Mod: 25 | Performed by: FAMILY MEDICINE

## 2024-05-14 PROCEDURE — 90472 IMMUNIZATION ADMIN EACH ADD: CPT | Performed by: FAMILY MEDICINE

## 2024-05-14 PROCEDURE — 90677 PCV20 VACCINE IM: CPT | Performed by: FAMILY MEDICINE

## 2024-05-14 PROCEDURE — 83036 HEMOGLOBIN GLYCOSYLATED A1C: CPT | Performed by: FAMILY MEDICINE

## 2024-05-14 PROCEDURE — 80053 COMPREHEN METABOLIC PANEL: CPT | Performed by: FAMILY MEDICINE

## 2024-05-14 PROCEDURE — 36415 COLL VENOUS BLD VENIPUNCTURE: CPT | Performed by: FAMILY MEDICINE

## 2024-05-14 RX ORDER — BENAZEPRIL/HYDROCHLOROTHIAZIDE 20 MG-25MG
1 TABLET ORAL DAILY
Qty: 90 TABLET | Refills: 3 | Status: SHIPPED | OUTPATIENT
Start: 2024-05-14

## 2024-05-14 NOTE — PATIENT INSTRUCTIONS
"Preventive Care Advice   This is general advice we often give to help people stay healthy. Your care team may have specific advice just for you. Please talk to your care team about your own preventive care needs.  Lifestyle  Exercise at least 150 minutes each week (30 minutes a day, 5 days a week).  Do muscle strengthening activities 2 days a week. These help control your weight and prevent disease.  No smoking.  Wear sunscreen to prevent skin cancer.  Have your home tested for radon every 2 to 5 years. Radon is a colorless, odorless gas that can harm your lungs. To learn more, go to www.health.[facility]. and search for \"Radon in Homes.\"  Keep guns unloaded and locked up in a safe place like a safe or gun vault, or, use a gun lock and hide the keys. Always lock away bullets separately. To learn more, visit [employer].gov and search for \"safe gun storage.\"  Nutrition  Eat 5 or more servings of fruits and vegetables each day.  Try wheat bread, brown rice and whole grain pasta (instead of white bread, rice, and pasta).  Get enough calcium and vitamin D. Check the label on foods and aim for 100% of the RDA (recommended daily allowance).  Regular exams  Have a dental exam and cleaning every 6 months.  See your health care team every year to talk about:  Any changes in your health.  Any medicines your care team has prescribed.  Preventive care, family planning, and ways to prevent chronic diseases.  Shots (vaccines)   HPV shots (up to age 26), if you've never had them before.  Hepatitis B shots (up to age 59), if you've never had them before.  COVID-19 shot: Get this shot when it's due.  Flu shot: Get a flu shot every year.  Tetanus shot: Get a tetanus shot every 10 years.  Pneumococcal, hepatitis A, and RSV shots: Ask your care team if you need these based on your risk.  Shingles shot (for age 50 and up).  General health tests  Diabetes screening:  Starting at age 35, Get screened for diabetes at least every 3 years.  If " you are younger than age 35, ask your care team if you should be screened for diabetes.  Cholesterol test: At age 39, start having a cholesterol test every 5 years, or more often if advised.  Bone density scan (DEXA): At age 50, ask your care team if you should have this scan for osteoporosis (brittle bones).  Hepatitis C: Get tested at least once in your life.  Abdominal aortic aneurysm screening: Talk to your doctor about having this screening if you:  Have ever smoked; and  Are biologically male; and  Are between the ages of 65 and 75.  STIs (sexually transmitted infections)  Before age 24: Ask your care team if you should be screened for STIs.  After age 24: Get screened for STIs if you're at risk. You are at risk for STIs (including HIV) if:  You are sexually active with more than one person.  You don't use condoms every time.  You or a partner was diagnosed with a sexually transmitted infection.  If you are at risk for HIV, ask about PrEP medicine to prevent HIV.  Get tested for HIV at least once in your life, whether you are at risk for HIV or not.  Cancer screening tests  Cervical cancer screening: If you have a cervix, begin getting regular cervical cancer screening tests at age 21. Most people who have regular screenings with normal results can stop after age 65. Talk about this with your provider.  Breast cancer scan (mammogram): If you've ever had breasts, begin having regular mammograms starting at age 40. This is a scan to check for breast cancer.  Colon cancer screening: It is important to start screening for colon cancer at age 45.  Have a colonoscopy test every 10 years (or more often if you're at risk) Or, ask your provider about stool tests like a FIT test every year or Cologuard test every 3 years.  To learn more about your testing options, visit: www.[employer]/725009.pdf.  For help making a decision, visit: randall/ld46664.  Prostate cancer screening test: If you have a prostate and are age 55  to 69, ask your provider if you would benefit from a yearly prostate cancer screening test.  Lung cancer screening: If you are a current or former smoker age 50 to 80, ask your care team if ongoing lung cancer screenings are right for you.  For informational purposes only. Not to replace the advice of your health care provider. Copyright   2023 [employer]. All rights reserved. Clinically reviewed by the [facility]. [employer] (acquired by [employer] in 2014) 430118 - REV 04/24.    Learning About Stress  What is stress?     Stress is your body's response to a hard situation. Your body can have a physical, emotional, or mental response. Stress is a fact of life for most people, and it affects everyone differently. What causes stress for you may not be stressful for someone else.  A lot of things can cause stress. You may feel stress when you go on a job interview, take a test, or run a race. This kind of short-term stress is normal and even useful. It can help you if you need to work hard or react quickly. For example, stress can help you finish an important job on time.  Long-term stress is caused by ongoing stressful situations or events. Examples of long-term stress include long-term health problems, ongoing problems at work, or conflicts in your family. Long-term stress can harm your health.  How does stress affect your health?  When you are stressed, your body responds as though you are in danger. It makes hormones that speed up your heart, make you breathe faster, and give you a burst of energy. This is called the fight-or-flight stress response. If the stress is over quickly, your body goes back to normal and no harm is done.  But if stress happens too often or lasts too long, it can have bad effects. Long-term stress can make you more likely to get sick, and it can make symptoms of some diseases worse. If you tense up when you are stressed, you may develop neck, shoulder, or low back pain. Stress is  linked to high blood pressure and heart disease.  Stress also harms your emotional health. It can make you saucedo, tense, or depressed. Your relationships may suffer, and you may not do well at work or school.  What can you do to manage stress?  You can try these things to help manage stress:   Do something active. Exercise or activity can help reduce stress. Walking is a great way to get started. Even everyday activities such as housecleaning or yard work can help.  Try yoga or nani chi. These techniques combine exercise and meditation. You may need some training at first to learn them.  Do something you enjoy. For example, listen to music or go to a movie. Practice your hobby or do volunteer work.  Meditate. This can help you relax, because you are not worrying about what happened before or what may happen in the future.  Do guided imagery. Imagine yourself in any setting that helps you feel calm. You can use online videos, books, or a teacher to guide you.  Do breathing exercises. For example:  From a standing position, bend forward from the waist with your knees slightly bent. Let your arms dangle close to the floor.  Breathe in slowly and deeply as you return to a standing position. Roll up slowly and lift your head last.  Hold your breath for just a few seconds in the standing position.  Breathe out slowly and bend forward from the waist.  Let your feelings out. Talk, laugh, cry, and express anger when you need to. Talking with supportive friends or family, a counselor, or a farhan leader about your feelings is a healthy way to relieve stress. Avoid discussing your feelings with people who make you feel worse.  Write. It may help to write about things that are bothering you. This helps you find out how much stress you feel and what is causing it. When you know this, you can find better ways to cope.  What can you do to prevent stress?  You might try some of these things to help prevent stress:  Manage your time.  "This helps you find time to do the things you want and need to do.  Get enough sleep. Your body recovers from the stresses of the day while you are sleeping.  Get support. Your family, friends, and community can make a difference in how you experience stress.  Limit your news feed. Avoid or limit time on social media or news that may make you feel stressed.  Do something active. Exercise or activity can help reduce stress. Walking is a great way to get started.  Where can you learn more?  Go to https://www.[employer]net/patiented  Enter N032 in the search box to learn more about \"Learning About Stress.\"  Current as of: October 24, 2023               Content Version: 14.0    9162-2434 [employer].   Care instructions adapted under license by your healthcare professional. If you have questions about a medical condition or this instruction, always ask your healthcare professional. [employer] disclaims any warranty or liability for your use of this information.      "

## 2024-05-14 NOTE — PROGRESS NOTES
Preventive Care Visit  [facility]  Tj Early MD, Family Medicine  May 14, 2024      Assessment & Plan     Routine general medical examination at a health care facility      Mixed hyperlipidemia    - Lipid panel reflex to direct LDL Non-fasting; Future  - Lipid panel reflex to direct LDL Non-fasting    Screen for colon cancer  The patient will call [facility] to schedule follow-up screening colonoscopy.  - Colonoscopy Screening  Referral; Future    Essential hypertension  Patient encouraged to continue healthy lifestyle changes, regular physical activities, continue current pharmacological treatment.  - benazepril-hydrochlorothiazide (LOTENSIN HCT) 20-25 MG tablet; Take 1 tablet by mouth daily  - Comprehensive metabolic panel; Future  - Hemoglobin A1c; Future  - Comprehensive metabolic panel  - Hemoglobin A1c    Nicotine use  A total of  5  minutes was spent discussing tobacco cessation program and different options treatment.      Patient has been advised of split billing requirements and indicates understanding: Yes  Review of external notes as documented elsewhere in note  30 minutes spent by me on the date of the encounter doing chart review, review of outside records, review of test results, interpretation of tests, patient visit, and documentation       Nicotine/Tobacco Cessation  He reports that he has been smoking cigarettes. He has never used smokeless tobacco.  Nicotine/Tobacco Cessation Plan  Self help information given to patient      Counseling  Appropriate preventive services were discussed with this patient, including applicable screening as appropriate for fall prevention, nutrition, physical activity, Tobacco-use cessation, weight loss and cognition.  Checklist reviewing preventive services available has been given to the patient.  Reviewed patient's diet, addressing concerns and/or questions.   He is at risk for lack of exercise and has been provided with  information to increase physical activity for the benefit of his well-being.       Regular exercise    Subjective   Matt is a 58 year old, presenting for the following:  Physical        5/14/2024    10:32 AM   Additional Questions   Roomed by Jose Luis MORIN        Health Care Directive  Patient does not have a Health Care Directive or Living Will: Discussed advance care planning with patient; information given to patient to review.    HPI  Overall doing fine, blood pressure is normal today.  He had a denture placed last year.  Still smoking maybe less than a pack a day, discussed screening lung cancer, he is precontemplative.      5/13/2024   General Health   How would you rate your overall physical health? Good   Feel stress (tense, anxious, or unable to sleep) To some extent   (!) STRESS CONCERN      5/13/2024   Nutrition   Three or more servings of calcium each day? Yes   Diet: Regular (no restrictions)   How many servings of fruit and vegetables per day? (!) 2-3   How many sweetened beverages each day? (!) 3         5/13/2024   Exercise   Days per week of moderate/strenous exercise 2 days   Average minutes spent exercising at this level 20 min   (!) EXERCISE CONCERN      5/13/2024   Social Factors   Frequency of gathering with friends or relatives Twice a week   Worry food won't last until get money to buy more No   Food not last or not have enough money for food? No   Do you have housing?  Yes   Are you worried about losing your housing? No   Lack of transportation? No   Unable to get utilities (heat,electricity)? No         5/13/2024   Fall Risk   Fallen 2 or more times in the past year? No   Trouble with walking or balance? No          5/13/2024   Dental   Dentist two times every year? Yes         5/13/2024   TB Screening   Were you born outside of the [place]? No         Today's PHQ-2 Score:       5/13/2024     9:04 AM   PHQ-2 ( 1999 [employer])   Q1: Little interest or pleasure in doing things 0   Q2: Feeling down, depressed  or hopeless 0   PHQ-2 Score 0   Q1: Little interest or pleasure in doing things Not at all   Q2: Feeling down, depressed or hopeless Not at all   PHQ-2 Score 0           5/13/2024   Substance Use   Alcohol more than 3/day or more than 7/wk No   Do you use any other substances recreationally? No     Social History     Tobacco Use    Smoking status: Every Day     Types: Cigarettes    Smokeless tobacco: Never   Vaping Use    Vaping status: Never Used           5/13/2024   STI Screening   New sexual partner(s) since last STI/HIV test? No   Last PSA:   Prostate Specific Antigen Screen   Date Value Ref Range Status   11/04/2021 0.57 0.00 - 3.50 ug/L Final     PSA Tumor Marker   Date Value Ref Range Status   02/15/2023 0.67 0.00 - 3.50 ng/mL Final     ASCVD Risk   The 10-year ASCVD risk score (Chayito KAUR, et al., 2019) is: 13.3%    Values used to calculate the score:      Age: 58 years      Sex: Male      Is Non- : No      Diabetic: No      Tobacco smoker: Yes      Systolic Blood Pressure: 137 mmHg      Is BP treated: Yes      HDL Cholesterol: 57 mg/dL      Total Cholesterol: 171 mg/dL           Reviewed and updated as needed this visit by Provider                    No past medical history on file.  No past surgical history on file.  BP Readings from Last 3 Encounters:   05/14/24 137/82   02/15/23 124/76   05/17/22 124/74    Wt Readings from Last 3 Encounters:   05/14/24 73.9 kg (163 lb)   02/15/23 69.4 kg (153 lb)   05/17/22 71.6 kg (157 lb 14.4 oz)                  Recent Labs   Lab Test 05/14/24  1110 02/15/23  1125 11/04/21  1149 10/29/20  1229 12/09/19  1145 10/11/18  1036   A1C 5.4 5.1  --   --   --  5.4   LDL  --  83 72 176*  --   --    HDL  --  57 55 48  --   --    TRIG  --  157* 178* 149  --   --    ALT  --  30 27 52* 31  --    CR  --  0.70 0.84 0.82 0.84  --    GFRESTIMATED  --  >90 >90 >60 >60  --    GFRESTBLACK  --   --   --  >60 >60  --    POTASSIUM  --  4.0 4.3 4.4 4.6  --   "  TSH  --  2.09  --  1.55  --   --           Review of Systems  Constitutional, HEENT, cardiovascular, pulmonary, gi and gu systems are negative, except as otherwise noted.     Objective    Exam  /82 (BP Location: Left arm, Patient Position: Sitting, Cuff Size: Adult Regular)   Pulse 71   Temp 98.2  F (36.8  C) (Temporal)   Resp 18   Ht 1.761 m (5' 9.33\")   Wt 73.9 kg (163 lb)   SpO2 98%   BMI 23.84 kg/m     Estimated body mass index is 23.84 kg/m  as calculated from the following:    Height as of this encounter: 1.761 m (5' 9.33\").    Weight as of this encounter: 73.9 kg (163 lb).    Physical Exam  GENERAL: alert and no distress  EYES: Eyes grossly normal to inspection, PERRL and conjunctivae and sclerae normal  HENT: ear canals and TM's normal, nose and mouth without ulcers or lesions  NECK: no adenopathy, no asymmetry, masses, or scars  RESP: lungs clear to auscultation - no rales, rhonchi or wheezes  CV: regular rate and rhythm, normal S1 S2, no S3 or S4, no murmur, click or rub, no peripheral edema  ABDOMEN: soft, nontender, no hepatosplenomegaly, no masses and bowel sounds normal  MS: no gross musculoskeletal defects noted, no edema  SKIN: no suspicious lesions or rashes  NEURO: Normal strength and tone, mentation intact and speech normal  PSYCH: mentation appears normal, affect normal/bright        Signed Electronically by: Tj Early MD      Prior to immunization administration, verified patients identity using patient s name and date of birth. Please see Immunization Activity for additional information.     Screening Questionnaire for Adult Immunization    Are you sick today?   No   Do you have allergies to medications, food, a vaccine component or latex?   No   Have you ever had a serious reaction after receiving a vaccination?   No   Do you have a long-term health problem with heart, lung, kidney, or metabolic disease (e.g., diabetes), asthma, a blood disorder, no spleen, complement " component deficiency, a cochlear implant, or a spinal fluid leak?  Are you on long-term aspirin therapy?   No   Do you have cancer, leukemia, HIV/AIDS, or any other immune system problem?   No   Do you have a parent, brother, or sister with an immune system problem?   No   In the past 3 months, have you taken medications that affect  your immune system, such as prednisone, other steroids, or anticancer drugs; drugs for the treatment of rheumatoid arthritis, Crohn s disease, or psoriasis; or have you had radiation treatments?   No   Have you had a seizure, or a brain or other nervous system problem?   No   During the past year, have you received a transfusion of blood or blood    products, or been given immune (gamma) globulin or antiviral drug?   No   For women: Are you pregnant or is there a chance you could become       pregnant during the next month?   No   Have you received any vaccinations in the past 4 weeks?   No     Immunization questionnaire answers were all negative.    This note was completed in part using a voice recognition software, any grammatical or context distortion are unintentional and inherent to the software.    Patient instructed to remain in clinic for 15 minutes afterwards, and to report any adverse reactions.     Screening performed by Jose Luis Lugo MA on 5/14/2024 at 10:39 AM.

## 2024-05-19 ENCOUNTER — MYC MEDICAL ADVICE (OUTPATIENT)
Dept: FAMILY MEDICINE | Facility: CLINIC | Age: 59
End: 2024-05-19
Payer: COMMERCIAL

## 2024-05-19 DIAGNOSIS — Z12.5 SCREENING FOR PROSTATE CANCER: Primary | ICD-10-CM

## 2024-07-22 DIAGNOSIS — E78.5 HYPERLIPIDEMIA LDL GOAL <130: ICD-10-CM

## 2024-07-22 RX ORDER — ATORVASTATIN CALCIUM 10 MG/1
10 TABLET, FILM COATED ORAL DAILY
Qty: 30 TABLET | Refills: 0 | Status: SHIPPED | OUTPATIENT
Start: 2024-07-22 | End: 2024-08-15

## 2024-07-22 RX ORDER — ATORVASTATIN CALCIUM 10 MG/1
10 TABLET, FILM COATED ORAL DAILY
Qty: 30 TABLET | Refills: 6 | OUTPATIENT
Start: 2024-07-22

## 2024-08-15 DIAGNOSIS — E78.5 HYPERLIPIDEMIA LDL GOAL <130: ICD-10-CM

## 2024-08-15 RX ORDER — ATORVASTATIN CALCIUM 10 MG/1
10 TABLET, FILM COATED ORAL DAILY
Qty: 90 TABLET | Refills: 1 | Status: SHIPPED | OUTPATIENT
Start: 2024-08-15

## 2024-08-15 RX ORDER — ATORVASTATIN CALCIUM 10 MG/1
10 TABLET, FILM COATED ORAL DAILY
Qty: 90 TABLET | Refills: 1 | Status: SHIPPED | OUTPATIENT
Start: 2024-08-15 | End: 2024-08-15

## 2024-11-06 DIAGNOSIS — F41.9 ANXIETY: ICD-10-CM

## 2024-11-06 RX ORDER — ALPRAZOLAM 0.5 MG
TABLET ORAL
Qty: 10 TABLET | Refills: 0 | Status: SHIPPED | OUTPATIENT
Start: 2024-11-06

## 2024-11-16 ENCOUNTER — NURSE TRIAGE (OUTPATIENT)
Dept: FAMILY MEDICINE | Facility: CLINIC | Age: 59
End: 2024-11-16
Payer: COMMERCIAL

## 2024-11-18 NOTE — TELEPHONE ENCOUNTER
"Nurse Triage SBAR    Is this a 2nd Level Triage? NO    Situation: Patient returned call. Patient had contacted clinic via aihuishou message requesting different medication for his anxiety/depression.     Background: Patient has a Hx of anxiety and currently on XANAX, but he states it does not currently work for him (needs something new). Has never been diagnosed with depression, this is something new for patient. He does not currently see a counselor/therapist.      Assessment: Started off as feeling nervous. Patient states \"I just can't shake this off, it's like a pit inside me.\" It's been constant since it started a couple of weeks ago before the Presidential Election (which may be possible cause of this new symptom). No suicidal/homicidal ideation. His  is very supportive in what's going on with him currently. Drinks alcoholic cocktails and smokes marijuana on occasion. He states he's still being active, not bedridden, still eating normally, he just can't get enough sleep.     Protocol Recommended Disposition: See in Office Within 3 Days    Recommendation: Assisted in scheduling appt for patient. Appt scheduled with PCP for tomorrow. Patient verbalizes agreement with plan.         Does the patient meet one of the following criteria for ADS visit consideration? 16+ years old, with an [facility] PCP     TIP  Providers, please consider if this condition is appropriate for management at one of our Acute and Diagnostic Services sites.     If patient is a good candidate, please use dotphrase <dot>triageresponse and select Refer to ADS to document.    Reason for Disposition   Patient wants to be seen    Additional Information   Negative: Patient attempted suicide   Negative: Patient is threatening suicide now   Negative: Violent behavior, or threatening to physically hurt or kill someone   Negative: Patient is very confused (disoriented, slurred speech) and no other adult (e.g., friend or family member) available   " "Negative: Difficult to awaken or acting very confused (disoriented, slurred speech) and new-onset   Negative: Sounds like a life-threatening emergency to the triager   Negative: Suicide thoughts, threats, attempts, or questions   Negative: Questions or concerns about alcohol use, unhealthy alcohol use, binge drinking, intoxication, or withdrawal   Negative: Questions or concerns about substance use (drug use), unhealthy drug use, intoxication, or withdrawal   Negative: Anxiety is main problem or symptom   Negative: Depression and unable to do any of normal activities (e.g., self care, school, work; in comparison to baseline).     Still able to do normal activities as patient described   Negative: Very strange or confused behavior   Negative: Patient sounds very sick or weak to the triager   Negative: Fever > 101 F  (38.3 C)   Negative: Sometimes has thoughts of suicide   Negative: Symptoms interfere with work or school   Negative: Depression is worsening (e.g.,sleeping poorly, less able to do activities of daily living)   Negative: Significant weight loss (> 10 pounds or 5 kg) and not dieting   Negative: Substance use (drug use) or misuse, known or suspected   Negative: Unhealthy alcohol use, known or suspected   Negative: New or changed psychiatric medications > 2 weeks ago and not feeling any better   Negative: Requesting to talk with a counselor (mental health worker, psychiatrist, etc.)    Answer Assessment - Initial Assessment Questions  1. CONCERN: \"What happened that made you call today?\"      It started a couple of weeks ago before the election  2. DEPRESSION SYMPTOM SCREENING: \"How are you feeling overall?\" (e.g., decreased energy, increased sleeping or difficulty sleeping, difficulty concentrating, feelings of sadness, guilt, hopelessness, or worthlessness)      Started off as neverousness, can't shake it off, \"mercy constant\"   3. RISK OF HARM - SUICIDAL IDEATION:  \"Do you ever have thoughts of hurting or " "killing yourself?\"  (e.g., yes, no, no but preoccupation with thoughts about death)    - INTENT:  \"Do you have thoughts of hurting or killing yourself right NOW?\" (e.g., yes, no, N/A)    - PLAN: \"Do you have a specific plan for how you would do this?\" (e.g., gun, knife, overdose, no plan, N/A)      No   4. RISK OF HARM - HOMICIDAL IDEATION:  \"Do you ever have thoughts of hurting or killing someone else?\"  (e.g., yes, no, no but preoccupation with thoughts about death)    - INTENT:  \"Do you have thoughts of hurting or killing someone right NOW?\" (e.g., yes, no, N/A)    - PLAN: \"Do you have a specific plan for how you would do this?\" (e.g., gun, knife, no plan, N/A)       No   5. FUNCTIONAL IMPAIRMENT: \"How have things been going for you overall? Have you had more difficulty than usual doing your normal daily activities?\"  (e.g., better, same, worse; self-care, school, work, interactions)     No, not bedridden, still eating and fine, unable to get enough sleep   6. SUPPORT: \"Who is with you now?\" \"Who do you live with?\" \"Do you have family or friends who you can talk to?\"       Yes,  has been very supportive   7. THERAPIST: \"Do you have a counselor or therapist? Name?\"      No   8. STRESSORS: \"Has there been any new stress or recent changes in your life?\"      Election   9. ALCOHOL USE OR SUBSTANCE USE (DRUG USE): \"Do you drink alcohol or use any illegal drugs?\"      Yes alcohol (cocktails) and smoke marijuana   10. OTHER: \"Do you have any other physical symptoms right now?\" (e.g., fever)        No   11. PREGNANCY: \"Is there any chance you are pregnant?\" \"When was your last menstrual period?\"        N/A    Protocols used: Depression-A-[place]      Maryam Tapia, MSN, RN   Triage Nurse   [facility]   "

## 2024-11-18 NOTE — TELEPHONE ENCOUNTER
RN attempted to contact patient, but no answer. Left message on patient's voice mail to call clinic back.    If patient calls back, please triage for depression and use sameday / acute slot if needed. Thanks    Latonia Brannon RN  [facility]    Matt LORENZO [facility] [place] (supporting Tj Ealry MD)  Hi Dr. Early,  I would like to talk yo you about changing anxiety/ depression medication needs.  For the last  couple weeks , I have been feeing hopelessness and depression and can't sleep.  I look forward to hearing from you.   Matt

## 2024-11-19 ENCOUNTER — OFFICE VISIT (OUTPATIENT)
Dept: FAMILY MEDICINE | Facility: CLINIC | Age: 59
End: 2024-11-19
Payer: COMMERCIAL

## 2024-11-19 VITALS
HEART RATE: 88 BPM | WEIGHT: 158 LBS | OXYGEN SATURATION: 96 % | TEMPERATURE: 97.9 F | RESPIRATION RATE: 16 BRPM | HEIGHT: 69 IN | BODY MASS INDEX: 23.4 KG/M2 | DIASTOLIC BLOOD PRESSURE: 79 MMHG | SYSTOLIC BLOOD PRESSURE: 120 MMHG

## 2024-11-19 DIAGNOSIS — F41.9 ANXIETY: Primary | ICD-10-CM

## 2024-11-19 DIAGNOSIS — Z12.11 SCREEN FOR COLON CANCER: ICD-10-CM

## 2024-11-19 PROCEDURE — 99214 OFFICE O/P EST MOD 30 MIN: CPT | Performed by: FAMILY MEDICINE

## 2024-11-19 RX ORDER — ALPRAZOLAM 0.5 MG
TABLET ORAL
Qty: 30 TABLET | Refills: 0 | Status: SHIPPED | OUTPATIENT
Start: 2024-11-19

## 2024-11-19 ASSESSMENT — ANXIETY QUESTIONNAIRES
5. BEING SO RESTLESS THAT IT IS HARD TO SIT STILL: MORE THAN HALF THE DAYS
GAD7 TOTAL SCORE: 17
2. NOT BEING ABLE TO STOP OR CONTROL WORRYING: NEARLY EVERY DAY
IF YOU CHECKED OFF ANY PROBLEMS ON THIS QUESTIONNAIRE, HOW DIFFICULT HAVE THESE PROBLEMS MADE IT FOR YOU TO DO YOUR WORK, TAKE CARE OF THINGS AT HOME, OR GET ALONG WITH OTHER PEOPLE: VERY DIFFICULT
3. WORRYING TOO MUCH ABOUT DIFFERENT THINGS: NEARLY EVERY DAY
8. IF YOU CHECKED OFF ANY PROBLEMS, HOW DIFFICULT HAVE THESE MADE IT FOR YOU TO DO YOUR WORK, TAKE CARE OF THINGS AT HOME, OR GET ALONG WITH OTHER PEOPLE?: VERY DIFFICULT
6. BECOMING EASILY ANNOYED OR IRRITABLE: SEVERAL DAYS
1. FEELING NERVOUS, ANXIOUS, OR ON EDGE: NEARLY EVERY DAY
7. FEELING AFRAID AS IF SOMETHING AWFUL MIGHT HAPPEN: NEARLY EVERY DAY
GAD7 TOTAL SCORE: 17
7. FEELING AFRAID AS IF SOMETHING AWFUL MIGHT HAPPEN: NEARLY EVERY DAY
GAD7 TOTAL SCORE: 17
4. TROUBLE RELAXING: MORE THAN HALF THE DAYS

## 2024-11-19 ASSESSMENT — PATIENT HEALTH QUESTIONNAIRE - PHQ9
SUM OF ALL RESPONSES TO PHQ QUESTIONS 1-9: 13
10. IF YOU CHECKED OFF ANY PROBLEMS, HOW DIFFICULT HAVE THESE PROBLEMS MADE IT FOR YOU TO DO YOUR WORK, TAKE CARE OF THINGS AT HOME, OR GET ALONG WITH OTHER PEOPLE: SOMEWHAT DIFFICULT
SUM OF ALL RESPONSES TO PHQ QUESTIONS 1-9: 13

## 2024-11-19 NOTE — PROGRESS NOTES
Assessment & Plan     Anxiety  Management of anxiety and depression discussed included addition of SSRI therapy, psychotherapy etc., patient continue relaxation technique, take Xanax as directed but if not improving then will let us know preferred option included daily medication and psychotherapy.  - ALPRAZolam (XANAX) 0.5 MG tablet; TAKE ONE TABLET BY MOUTH DAILY AS NEEDED FOR ANXIETY    Screen for colon cancer    Negative screening Cologuard test few months ago, reviewed from care everywhere, had polyps removed from previous colonoscopy  Discussed colonoscopy option for better surveillance of polyps, With history of diverticulitis..    Review of external notes as documented elsewhere in note  30 minutes spent by me on the date of the encounter doing chart review, review of outside records, review of test results, interpretation of tests, patient visit, and documentation     Depression Screening Follow Up        11/19/2024     2:36 PM   PHQ   PHQ-9 Total Score 13    Q9: Thoughts of better off dead/self-harm past 2 weeks Not at all        Patient-reported         11/19/2024     2:36 PM   Last PHQ-9   1.  Little interest or pleasure in doing things 3    2.  Feeling down, depressed, or hopeless 3    3.  Trouble falling or staying asleep, or sleeping too much 3    4.  Feeling tired or having little energy 2    5.  Poor appetite or overeating 1    6.  Feeling bad about yourself 0    7.  Trouble concentrating 1    8.  Moving slowly or restless 0    Q9: Thoughts of better off dead/self-harm past 2 weeks 0    PHQ-9 Total Score 13        Patient-reported         Follow Up Actions Taken  Crisis resource information provided in After Visit Summary  Depression Action Plan reviewed with patient.       Regular exercise    Sukhjinder   Matt is a 59 year old, presenting for the following health issues:  Mental Health Problem and Depression (Pt stated that depression is getting worse )      11/19/2024     2:54 PM   Additional  "Questions   Roomed by hser   Accompanied by self     Mental Health Problem    History of Present Illness       Mental Health Follow-up:  Patient presents to follow-up on Depression & Anxiety.Patient's depression since last visit has been:  Worse  The patient is not having other symptoms associated with depression.  Patient's anxiety since last visit has been:  Worse  The patient is not having other symptoms associated with anxiety.  Any significant life events: No  Patient is feeling anxious or having panic attacks.  Patient has no concerns about alcohol or drug use.   He is taking medications regularly.       Anxiety symptoms are getting a bit more intense lately, started after the presidential election, feeling nervous, ruminating, worried that something worse will happen, taking Xanax a bit more frequently, will run  out of medication soon.  Here to discuss treatment option.  Noticed mild improvement today.      Review of Systems  Constitutional, HEENT, cardiovascular, pulmonary, gi and gu systems are negative, except as otherwise noted.      Objective    Resp 16   Ht 1.753 m (5' 9\")   Wt 71.7 kg (158 lb)   BMI 23.33 kg/m    Body mass index is 23.33 kg/m .  Physical Exam   GENERAL: alert and no distress  NECK: no adenopathy, no asymmetry, masses, or scars  RESP: lungs clear to auscultation - no rales, rhonchi or wheezes  CV: regular rate and rhythm, normal S1 S2, no S3 or S4, no murmur, click or rub, no peripheral edema  ABDOMEN: soft, nontender, no hepatosplenomegaly, no masses and bowel sounds normal  MS: no gross musculoskeletal defects noted, no edema    No results found for this or any previous visit (from the past 24 hours).        Signed Electronically by: Tj Early MD      Prior to immunization administration, verified patients identity using patient s name and date of birth. Please see Immunization Activity for additional information.     Screening Questionnaire for Adult Immunization    Are you " sick today?   No   Do you have allergies to medications, food, a vaccine component or latex?   No   Have you ever had a serious reaction after receiving a vaccination?   No   Do you have a long-term health problem with heart, lung, kidney, or metabolic disease (e.g., diabetes), asthma, a blood disorder, no spleen, complement component deficiency, a cochlear implant, or a spinal fluid leak?  Are you on long-term aspirin therapy?   No   Do you have cancer, leukemia, HIV/AIDS, or any other immune system problem?   No   Do you have a parent, brother, or sister with an immune system problem?   No   In the past 3 months, have you taken medications that affect  your immune system, such as prednisone, other steroids, or anticancer drugs; drugs for the treatment of rheumatoid arthritis, Crohn s disease, or psoriasis; or have you had radiation treatments?   No   Have you had a seizure, or a brain or other nervous system problem?   No   During the past year, have you received a transfusion of blood or blood    products, or been given immune (gamma) globulin or antiviral drug?   No   For women: Are you pregnant or is there a chance you could become       pregnant during the next month?   No   Have you received any vaccinations in the past 4 weeks?   Yes     Immunization questionnaire was positive for at least one answer.  Notified provider.      Patient instructed to remain in clinic for 15 minutes afterwards, and to report any adverse reactions.     Screening performed by Edelmira Vaz MA on 11/19/2024 at 2:57 PM.     PHQ-9 score:        11/19/2024     2:36 PM   PHQ   PHQ-9 Total Score 13    Q9: Thoughts of better off dead/self-harm past 2 weeks Not at all        Patient-reported         This note was completed in part using a voice recognition software, any grammatical or context distortion are unintentional and inherent to the software.

## 2025-02-03 DIAGNOSIS — E78.5 HYPERLIPIDEMIA LDL GOAL <130: ICD-10-CM

## 2025-02-03 RX ORDER — ATORVASTATIN CALCIUM 10 MG/1
10 TABLET, FILM COATED ORAL DAILY
Qty: 90 TABLET | Refills: 2 | Status: SHIPPED | OUTPATIENT
Start: 2025-02-03

## 2025-04-07 DIAGNOSIS — I10 ESSENTIAL HYPERTENSION: ICD-10-CM

## 2025-04-07 RX ORDER — BENAZEPRIL/HYDROCHLOROTHIAZIDE 20 MG-25MG
1 TABLET ORAL DAILY
Qty: 90 TABLET | Refills: 0 | OUTPATIENT
Start: 2025-04-07

## 2025-04-14 ENCOUNTER — PATIENT OUTREACH (OUTPATIENT)
Dept: CARE COORDINATION | Facility: CLINIC | Age: 60
End: 2025-04-14
Payer: COMMERCIAL

## 2025-04-15 ENCOUNTER — MYC REFILL (OUTPATIENT)
Dept: FAMILY MEDICINE | Facility: CLINIC | Age: 60
End: 2025-04-15
Payer: COMMERCIAL

## 2025-04-15 DIAGNOSIS — I10 ESSENTIAL HYPERTENSION: ICD-10-CM

## 2025-04-16 DIAGNOSIS — I10 ESSENTIAL HYPERTENSION: ICD-10-CM

## 2025-04-16 RX ORDER — BENAZEPRIL/HYDROCHLOROTHIAZIDE 20 MG-25MG
1 TABLET ORAL DAILY
Qty: 90 TABLET | Refills: 3 | OUTPATIENT
Start: 2025-04-16

## 2025-04-16 RX ORDER — BENAZEPRIL/HYDROCHLOROTHIAZIDE 20 MG-25MG
1 TABLET ORAL DAILY
Qty: 90 TABLET | Refills: 0 | Status: SHIPPED | OUTPATIENT
Start: 2025-04-16

## 2025-04-16 NOTE — TELEPHONE ENCOUNTER
Medication Question or Refill    Contacts       Contact Date/Time Type Contact Phone/Fax    04/16/2025 02:42 PM CDT Phone (Incoming) [id number] - [place], [place] - [address] (Pharmacy) [phone]            What medication are you calling about (include dose and sig)?: benazepril-hydrochlorothiazide (LOTENSIN HCT) 20-25 MG tablet     Preferred Pharmacy:     [id number] - [place], [place] - [address]  [address] [place] [address]  Phone: [phone] Fax: [phone]      Controlled Substance Agreement on file:   CSA -- Patient Level:    CSA: None found at the patient level.       Who prescribed the medication?: pcp    Do you need a refill? Yes    When did you use the medication last? na

## 2025-04-17 DIAGNOSIS — I10 ESSENTIAL HYPERTENSION: ICD-10-CM

## 2025-04-17 RX ORDER — BENAZEPRIL/HYDROCHLOROTHIAZIDE 20 MG-25MG
1 TABLET ORAL DAILY
Qty: 90 TABLET | Refills: 0 | OUTPATIENT
Start: 2025-04-17

## 2025-04-28 ENCOUNTER — PATIENT OUTREACH (OUTPATIENT)
Dept: CARE COORDINATION | Facility: CLINIC | Age: 60
End: 2025-04-28
Payer: COMMERCIAL

## 2025-06-28 ENCOUNTER — HEALTH MAINTENANCE LETTER (OUTPATIENT)
Age: 60
End: 2025-06-28

## 2025-07-11 DIAGNOSIS — I10 ESSENTIAL HYPERTENSION: ICD-10-CM

## 2025-07-14 RX ORDER — BENAZEPRIL/HYDROCHLOROTHIAZIDE 20 MG-25MG
1 TABLET ORAL DAILY
Qty: 90 TABLET | Refills: 0 | Status: SHIPPED | OUTPATIENT
Start: 2025-07-14